# Patient Record
Sex: FEMALE | Race: WHITE | Employment: FULL TIME | URBAN - METROPOLITAN AREA
[De-identification: names, ages, dates, MRNs, and addresses within clinical notes are randomized per-mention and may not be internally consistent; named-entity substitution may affect disease eponyms.]

---

## 2017-12-13 ENCOUNTER — OFFICE VISIT (OUTPATIENT)
Dept: FAMILY MEDICINE CLINIC | Facility: CLINIC | Age: 42
End: 2017-12-13

## 2017-12-13 VITALS
HEART RATE: 92 BPM | SYSTOLIC BLOOD PRESSURE: 100 MMHG | BODY MASS INDEX: 27.88 KG/M2 | DIASTOLIC BLOOD PRESSURE: 70 MMHG | WEIGHT: 131 LBS | OXYGEN SATURATION: 97 % | HEIGHT: 57.5 IN

## 2017-12-13 DIAGNOSIS — M79.10 MYALGIA: Primary | ICD-10-CM

## 2017-12-13 DIAGNOSIS — M72.2 PLANTAR FASCIITIS: ICD-10-CM

## 2017-12-13 DIAGNOSIS — Z00.00 HEALTHCARE MAINTENANCE: ICD-10-CM

## 2017-12-13 PROCEDURE — 99204 OFFICE O/P NEW MOD 45 MIN: CPT | Performed by: FAMILY MEDICINE

## 2017-12-13 RX ORDER — NAPROXEN 500 MG/1
500 TABLET ORAL 2 TIMES DAILY WITH MEALS
Qty: 28 TABLET | Refills: 0 | Status: SHIPPED | OUTPATIENT
Start: 2017-12-13 | End: 2017-12-27

## 2017-12-13 NOTE — PATIENT INSTRUCTIONS
Fascitis Plantar [Plantar Fasciitis]  La fascitis plantar es lilo inflamación dolorosa del tejido que cubre los huesos en la planta de apodaca pie. Ésta se puede desarrollar gradualmente o repentinamente. Normalmente afecta a un pie por vez.  El dolor de talón 6. Lo annia que debe hacer en la mañana y antes de hacer deportes, es estirar la planta de los pies. Flexione suavemente apodaca tobillo de manera que el pie se mueva hacia la rodilla. 7. Aplicarse hielo puede ayudar a controlar el dolor en el talón.  Aplíque

## 2017-12-17 NOTE — PROGRESS NOTES
HPI:    Patient ID: Stevenson Person is a 40year old female with body pain.       Body pain  -has b/l shoulder, knee, ankle, foot pain pain-for 2mo, gradually worsening.   -Pain is dull and soreness. Constant.  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  Pulmonary/Chest: Effort normal and breath sounds normal. No respiratory distress. She has no wheezes. She has no rales. Abdominal: Soft. Bowel sounds are normal. She exhibits no distension. There is no tenderness.    Musculoskeletal:      Right sh rash noted.               ASSESSMENT/PLAN:   Plantar fasciitis  -Rx for naproxen BID  -Advised to obtain good support shoes and orthotics  -Start PT exercises from handout provided  -rest and ice  -if no improvement may need custom orthotics, steroid inject

## 2017-12-29 ENCOUNTER — LAB ENCOUNTER (OUTPATIENT)
Dept: LAB | Facility: HOSPITAL | Age: 42
End: 2017-12-29
Attending: FAMILY MEDICINE
Payer: COMMERCIAL

## 2017-12-29 DIAGNOSIS — M79.10 MYALGIA: ICD-10-CM

## 2017-12-29 DIAGNOSIS — Z00.00 HEALTHCARE MAINTENANCE: ICD-10-CM

## 2017-12-29 LAB
ERYTHROCYTE [DISTWIDTH] IN BLOOD BY AUTOMATED COUNT: 13.6 % (ref 11–15)
HCT VFR BLD AUTO: 38.2 % (ref 35–48)
HGB BLD-MCNC: 12.5 G/DL (ref 12–16)
MCH RBC QN AUTO: 27.2 PG (ref 27–32)
MCHC RBC AUTO-ENTMCNC: 32.8 G/DL (ref 32–37)
MCV RBC AUTO: 82.8 FL (ref 80–100)
PLATELET # BLD AUTO: 233 K/UL (ref 140–400)
PMV BLD AUTO: 9.1 FL (ref 7.4–10.3)
RBC # BLD AUTO: 4.61 M/UL (ref 3.7–5.4)
WBC # BLD AUTO: 5.3 K/UL (ref 4–11)

## 2017-12-29 PROCEDURE — 80061 LIPID PANEL: CPT | Performed by: FAMILY MEDICINE

## 2017-12-29 PROCEDURE — 80053 COMPREHEN METABOLIC PANEL: CPT | Performed by: FAMILY MEDICINE

## 2017-12-29 PROCEDURE — 86235 NUCLEAR ANTIGEN ANTIBODY: CPT

## 2017-12-29 PROCEDURE — 86039 ANTINUCLEAR ANTIBODIES (ANA): CPT

## 2017-12-29 PROCEDURE — 86200 CCP ANTIBODY: CPT | Performed by: FAMILY MEDICINE

## 2017-12-29 PROCEDURE — 84443 ASSAY THYROID STIM HORMONE: CPT | Performed by: FAMILY MEDICINE

## 2017-12-29 PROCEDURE — 84439 ASSAY OF FREE THYROXINE: CPT | Performed by: FAMILY MEDICINE

## 2017-12-29 PROCEDURE — 82306 VITAMIN D 25 HYDROXY: CPT | Performed by: FAMILY MEDICINE

## 2017-12-29 PROCEDURE — 86431 RHEUMATOID FACTOR QUANT: CPT | Performed by: FAMILY MEDICINE

## 2017-12-29 PROCEDURE — 85652 RBC SED RATE AUTOMATED: CPT | Performed by: FAMILY MEDICINE

## 2017-12-29 PROCEDURE — 86140 C-REACTIVE PROTEIN: CPT | Performed by: FAMILY MEDICINE

## 2017-12-29 PROCEDURE — 86225 DNA ANTIBODY NATIVE: CPT

## 2017-12-29 PROCEDURE — 36415 COLL VENOUS BLD VENIPUNCTURE: CPT

## 2017-12-29 PROCEDURE — 82550 ASSAY OF CK (CPK): CPT | Performed by: FAMILY MEDICINE

## 2017-12-29 PROCEDURE — 85027 COMPLETE CBC AUTOMATED: CPT

## 2017-12-29 PROCEDURE — 83036 HEMOGLOBIN GLYCOSYLATED A1C: CPT | Performed by: FAMILY MEDICINE

## 2017-12-29 PROCEDURE — 86038 ANTINUCLEAR ANTIBODIES: CPT

## 2017-12-29 RX ORDER — NAPROXEN 500 MG/1
TABLET ORAL
Qty: 30 TABLET | Refills: 0 | Status: SHIPPED | OUTPATIENT
Start: 2017-12-29 | End: 2018-01-09

## 2017-12-29 RX ORDER — LEVOTHYROXINE SODIUM 88 UG/1
TABLET ORAL
Qty: 60 TABLET | Refills: 0 | Status: SHIPPED | OUTPATIENT
Start: 2017-12-29 | End: 2018-02-28

## 2018-01-02 LAB
ANA NUCLEOLAR TITR SER IF: 80 {TITER}
DSDNA AB TITR SER: <10 {TITER}
NUCLEAR IGG TITR SER IF: POSITIVE {TITER}

## 2018-01-03 LAB
ENA SM IGG SER QL: NEGATIVE
ENA SM+RNP AB SER QL: NEGATIVE
ENA SS-A AB SER QL IA: NEGATIVE
ENA SS-B AB SER QL IA: NEGATIVE

## 2018-01-04 LAB — 25(OH)D3 SERPL-MCNC: 36.2 NG/ML

## 2018-01-09 ENCOUNTER — TELEPHONE (OUTPATIENT)
Dept: FAMILY MEDICINE CLINIC | Facility: CLINIC | Age: 43
End: 2018-01-09

## 2018-01-09 DIAGNOSIS — M05.79 RHEUMATOID ARTHRITIS INVOLVING MULTIPLE SITES WITH POSITIVE RHEUMATOID FACTOR (HCC): Primary | ICD-10-CM

## 2018-01-09 RX ORDER — NAPROXEN 500 MG/1
TABLET ORAL
Qty: 30 TABLET | Refills: 0 | Status: SHIPPED | OUTPATIENT
Start: 2018-01-09 | End: 2018-01-18

## 2018-01-09 NOTE — TELEPHONE ENCOUNTER
I called patient to provide her the # to Dr Yue Cantu, but she is at work and couldn't talk to me, I suggested to send me to her voicemail for me to leave the specialist information, she agreed.   I called patient back and left her a v/m with specialist's i

## 2018-01-18 ENCOUNTER — TELEPHONE (OUTPATIENT)
Dept: FAMILY MEDICINE CLINIC | Facility: CLINIC | Age: 43
End: 2018-01-18

## 2018-01-18 RX ORDER — NAPROXEN 500 MG/1
TABLET ORAL
Qty: 30 TABLET | Refills: 1 | Status: SHIPPED | OUTPATIENT
Start: 2018-01-18 | End: 2018-01-31

## 2018-01-31 ENCOUNTER — OFFICE VISIT (OUTPATIENT)
Dept: FAMILY MEDICINE CLINIC | Facility: CLINIC | Age: 43
End: 2018-01-31

## 2018-01-31 VITALS — DIASTOLIC BLOOD PRESSURE: 70 MMHG | SYSTOLIC BLOOD PRESSURE: 108 MMHG | OXYGEN SATURATION: 98 % | HEART RATE: 92 BPM

## 2018-01-31 DIAGNOSIS — E03.9 HYPOTHYROIDISM, UNSPECIFIED TYPE: ICD-10-CM

## 2018-01-31 DIAGNOSIS — M05.79 RHEUMATOID ARTHRITIS INVOLVING MULTIPLE SITES WITH POSITIVE RHEUMATOID FACTOR (HCC): Primary | ICD-10-CM

## 2018-01-31 DIAGNOSIS — L21.9 SEBORRHEIC DERMATITIS OF SCALP: ICD-10-CM

## 2018-01-31 DIAGNOSIS — R74.01 TRANSAMINITIS: ICD-10-CM

## 2018-01-31 PROCEDURE — 36415 COLL VENOUS BLD VENIPUNCTURE: CPT | Performed by: FAMILY MEDICINE

## 2018-01-31 PROCEDURE — 90471 IMMUNIZATION ADMIN: CPT | Performed by: FAMILY MEDICINE

## 2018-01-31 PROCEDURE — 99213 OFFICE O/P EST LOW 20 MIN: CPT | Performed by: FAMILY MEDICINE

## 2018-01-31 PROCEDURE — 90686 IIV4 VACC NO PRSV 0.5 ML IM: CPT | Performed by: FAMILY MEDICINE

## 2018-01-31 RX ORDER — NAPROXEN 500 MG/1
TABLET ORAL
Qty: 30 TABLET | Refills: 1 | Status: SHIPPED | OUTPATIENT
Start: 2018-01-31 | End: 2018-07-10

## 2018-01-31 RX ORDER — PREDNISONE 1 MG/1
5 TABLET ORAL DAILY
Qty: 7 TABLET | Refills: 0 | Status: SHIPPED | OUTPATIENT
Start: 2018-01-31 | End: 2018-02-02

## 2018-02-01 LAB
AMB EXT CREATININE: 0.86 MG/DL
AMB EXT GFR: 83
AMB EXT GLUCOSE: 119 MG/DL
AMB EXT TSH: 1.66 MIU/ML

## 2018-02-01 NOTE — PATIENT INSTRUCTIONS
Hipotiroidismo    A usted le montes diagnosticado hipotiroidismo. Halma significa que apodaca glándula tiroides no está produciendo suficiente hormona tiroidea. Esta hormona es importante para el crecimiento del cuerpo y el metabolismo.  Si no tiene suficiente, mu · [place] marilyn pastillas de hormona tiroidea exactamente james le indicó apodaca proveedor de [place] quiroz. En la [employer] de los casos es lilo pastilla por día con el estómago vacío.  Use un pastillero con los días de la semana para ayudarse a recordar noble apodaca pas © 7239-6584 [address]. [facility], [facility]. Todos los derechos reservados. Esta información no pretende sustituir la atención médica profesional. Sólo apodaca médico puede diagnosticar y tratar un problema de aldo. · Para ayudar a controlar la hinchazón y el dolor, puede noble paracetamol (acetaminofeno) o ibuprofeno, a menos que se le haya recetado otro medicamento antiinflamatorio no esteroide (\"NSAID\", por marilyn siglas en inglés).  Nota: Si usted tiene lilo enfermed · Hable con apodaca proveedor de atención médica o terapeuta ocupacional acerca de las maneras más fáciles de llevar a cabo las labores diarias. [place] puede incluir el uso de dispositivos de asistencia.  Son herramientas especiales que pueden ayudarle con tareas

## 2018-02-02 ENCOUNTER — OFFICE VISIT (OUTPATIENT)
Dept: RHEUMATOLOGY | Facility: CLINIC | Age: 43
End: 2018-02-02

## 2018-02-02 VITALS
HEART RATE: 80 BPM | SYSTOLIC BLOOD PRESSURE: 113 MMHG | BODY MASS INDEX: 27.8 KG/M2 | WEIGHT: 130.63 LBS | HEIGHT: 57.5 IN | DIASTOLIC BLOOD PRESSURE: 77 MMHG

## 2018-02-02 DIAGNOSIS — M05.79 RHEUMATOID ARTHRITIS INVOLVING MULTIPLE SITES WITH POSITIVE RHEUMATOID FACTOR (HCC): Primary | ICD-10-CM

## 2018-02-02 DIAGNOSIS — Z51.81 ENCOUNTER FOR THERAPEUTIC DRUG MONITORING: ICD-10-CM

## 2018-02-02 PROCEDURE — 99244 OFF/OP CNSLTJ NEW/EST MOD 40: CPT | Performed by: INTERNAL MEDICINE

## 2018-02-02 PROCEDURE — 99212 OFFICE O/P EST SF 10 MIN: CPT | Performed by: INTERNAL MEDICINE

## 2018-02-02 RX ORDER — KETOCONAZOLE 20 MG/ML
SHAMPOO TOPICAL
Qty: 120 ML | Refills: 1 | Status: SHIPPED | OUTPATIENT
Start: 2018-02-02 | End: 2018-06-13

## 2018-02-02 RX ORDER — FOLIC ACID 1 MG/1
TABLET ORAL
Qty: 90 TABLET | Refills: 3 | Status: SHIPPED | OUTPATIENT
Start: 2018-02-02 | End: 2019-01-06

## 2018-02-02 RX ORDER — METHOTREXATE 2.5 MG/1
TABLET ORAL
Qty: 20 TABLET | Refills: 1 | Status: SHIPPED | OUTPATIENT
Start: 2018-02-02 | End: 2018-04-02

## 2018-02-02 RX ORDER — PREDNISONE 1 MG/1
TABLET ORAL
Qty: 100 TABLET | Refills: 1 | Status: SHIPPED | OUTPATIENT
Start: 2018-02-02 | End: 2018-03-28

## 2018-02-02 NOTE — PROGRESS NOTES
Dear Dr. Lisandra Snell: I saw your patient Samreen Peck in consultation this afternoon at your request for evaluation of rheumatoid arthritis.   As you know, she is a delightful 31-year-old Cook Islander speaker, who 6 weeks ago developed pain, swelling, nausea or vomiting, constipation or diarrhea, blood in her stools. No trouble urinating. No dry eyes or mouth. No Raynaud's or headache. Physical exam:  Pleasant woman in no acute distress. Blood pressure 113/77, pulse 80, height 4' 9.5\" (1.461 m), w

## 2018-02-02 NOTE — PROGRESS NOTES
HPI:    Patient ID: Jaydon Landrum is a 44year old female.     RA  -naproxen helping but still with pain in hands, wrist, feet, hips, shoulders  -hard to  objects or make fists  -has upcoming meeting w/ rheum in 3d    Hypothyroid/DM  -compliant w/ m Normocephalic and atraumatic. Mouth/Throat: Mucous membranes are normal.   Eyes: Conjunctivae are normal. Pupils are equal, round, and reactive to light. Neck: Neck supple. Cardiovascular: Normal rate and regular rhythm. No murmur heard.   Pulmonar 0.5 ML         WK#[id number]

## 2018-02-21 ENCOUNTER — TELEPHONE (OUTPATIENT)
Dept: FAMILY MEDICINE CLINIC | Facility: CLINIC | Age: 43
End: 2018-02-21

## 2018-02-21 NOTE — TELEPHONE ENCOUNTER
Called and tried leaving a message for patient to inform her regarding a bill she received for 30$. Called the billing department to get more information.  Billing department will sent bill for review stating insurance might have Dr. Lonzo Sandifer as a Jay Vivaringmariluz

## 2018-02-28 ENCOUNTER — TELEPHONE (OUTPATIENT)
Dept: FAMILY MEDICINE CLINIC | Facility: CLINIC | Age: 43
End: 2018-02-28

## 2018-02-28 RX ORDER — LEVOTHYROXINE SODIUM 88 UG/1
TABLET ORAL
Qty: 30 TABLET | Refills: 0 | Status: SHIPPED | OUTPATIENT
Start: 2018-02-28 | End: 2018-03-28

## 2018-03-02 ENCOUNTER — OFFICE VISIT (OUTPATIENT)
Dept: RHEUMATOLOGY | Facility: CLINIC | Age: 43
End: 2018-03-02

## 2018-03-02 VITALS
WEIGHT: 133 LBS | BODY MASS INDEX: 28.3 KG/M2 | HEIGHT: 57.5 IN | HEART RATE: 97 BPM | DIASTOLIC BLOOD PRESSURE: 71 MMHG | SYSTOLIC BLOOD PRESSURE: 119 MMHG

## 2018-03-02 DIAGNOSIS — Z51.81 ENCOUNTER FOR THERAPEUTIC DRUG MONITORING: ICD-10-CM

## 2018-03-02 DIAGNOSIS — M05.79 RHEUMATOID ARTHRITIS INVOLVING MULTIPLE SITES WITH POSITIVE RHEUMATOID FACTOR (HCC): Primary | ICD-10-CM

## 2018-03-02 PROCEDURE — 99214 OFFICE O/P EST MOD 30 MIN: CPT | Performed by: INTERNAL MEDICINE

## 2018-03-02 PROCEDURE — 99212 OFFICE O/P EST SF 10 MIN: CPT | Performed by: INTERNAL MEDICINE

## 2018-03-02 NOTE — PROGRESS NOTES
HPI:    Patient ID: Yoon Victor is a 44year old female. Kari Alizejigna is a 50-year-old patient of Dr. Bashir Salazar, with recent onset CCP and RF positive rheumatoid arthritis. Her family is here to interpret her Indonesian today.     She is on 10 mg of Rfl: 1     Allergies:No Known Allergies   PHYSICAL EXAM:   Physical Exam   Constitutional: She is oriented to person, place, and time. She appears well-developed and well-nourished. HENT:   Head: Normocephalic.    Eyes: Conjunctivae are normal.   Cardiova

## 2018-03-09 LAB
ABSOLUTE BASOPHILS: 22 CELLS/UL (ref 0–200)
ABSOLUTE EOSINOPHILS: 80 CELLS/UL (ref 15–500)
ABSOLUTE LYMPHOCYTES: 1007 CELLS/UL (ref 850–3900)
ABSOLUTE MONOCYTES: 256 CELLS/UL (ref 200–950)
ABSOLUTE NEUTROPHILS: 5935 CELLS/UL (ref 1500–7800)
ALBUMIN: 4 G/DL (ref 3.6–5.1)
AST: 16 U/L (ref 10–30)
BASOPHILS: 0.3 %
C-REACTIVE PROTEIN: 1.5 MG/L
CREATININE: 0.63 MG/DL (ref 0.5–1.1)
EGFR IF AFRICN AM: 128 ML/MIN/1.73M2
EGFR IF NONAFRICN AM: 111 ML/MIN/1.73M2
EOSINOPHILS: 1.1 %
HEMATOCRIT: 38 % (ref 35–45)
HEMOGLOBIN: 12.2 G/DL (ref 11.7–15.5)
LYMPHOCYTES: 13.8 %
MCH: 26.1 PG (ref 27–33)
MCHC: 32.1 G/DL (ref 32–36)
MCV: 81.4 FL (ref 80–100)
MONOCYTES: 3.5 %
MPV: 11.9 FL (ref 7.5–12.5)
NEUTROPHILS: 81.3 %
PLATELET COUNT: 252 THOUSAND/UL (ref 140–400)
RDW: 13.5 % (ref 11–15)
RED BLOOD CELL COUNT: 4.67 MILLION/UL (ref 3.8–5.1)
SED RATE BY MODIFIED$WESTERGREN: 19 MM/H
WHITE BLOOD CELL COUNT: 7.3 THOUSAND/UL (ref 3.8–10.8)

## 2018-03-14 ENCOUNTER — OFFICE VISIT (OUTPATIENT)
Dept: FAMILY MEDICINE CLINIC | Facility: CLINIC | Age: 43
End: 2018-03-14

## 2018-03-14 VITALS
HEIGHT: 57.5 IN | HEART RATE: 86 BPM | DIASTOLIC BLOOD PRESSURE: 60 MMHG | SYSTOLIC BLOOD PRESSURE: 110 MMHG | RESPIRATION RATE: 16 BRPM | WEIGHT: 133 LBS | BODY MASS INDEX: 28.3 KG/M2 | OXYGEN SATURATION: 98 %

## 2018-03-14 DIAGNOSIS — E11.9 TYPE 2 DIABETES MELLITUS WITHOUT COMPLICATION, WITHOUT LONG-TERM CURRENT USE OF INSULIN (HCC): Primary | ICD-10-CM

## 2018-03-14 DIAGNOSIS — E03.9 HYPOTHYROIDISM, UNSPECIFIED TYPE: ICD-10-CM

## 2018-03-14 DIAGNOSIS — M05.79 RHEUMATOID ARTHRITIS INVOLVING MULTIPLE SITES WITH POSITIVE RHEUMATOID FACTOR (HCC): ICD-10-CM

## 2018-03-14 PROCEDURE — 99213 OFFICE O/P EST LOW 20 MIN: CPT | Performed by: FAMILY MEDICINE

## 2018-03-14 NOTE — PATIENT INSTRUCTIONS
Dieta: Diabetes [Diabetic Diet]  Los alimentos son Paulette Melara herramienta importante que puede utilizar para controlar la diabetes y [place] law.  [place] alimentos junie equilibrados en las cantidades [employer] ayudará a controlar la concentración de azúcar · Coma menos grasa para ayudar a reducir apodaca riesgo de enfermedad del corazón. Consuma productos lácteos sin grasa o con bajo contenido graso y Terryl Mins. Evite los alimentos fritos. Use aceites de cocina no saturados.   · Consulte a apodaca nutricionista sob · ¿Se siente cansado constantemente? · ¿Tiene necesidad de orinar a menudo? · ¿Tiene sed o hambre continuamente? · ¿Está perdiendo peso sin motivo aparente? · ¿Tardan mucho en curársele las cortadas y los moretones?   · ¿Siente hormigueo o adormecimient Manejar la diabetes tipo 1 significa equilibrar el uso de insulina con la dieta y la actividad. Usted tendrá que revisarse apodaca nivel de azúcar en la angela y, a veces, las cetonas.  También tendrá que trabajar con apodaca proveedor de atención médica Dennice Paddock [place] a apodaca proveedor de [employer] trabaje con linda para crear un programa de actividad que sea [place] para marilyn necesidades.  Apodaca programa de actividad estará basado en apodaca edad, estado general de aldo y el tipo de actividades que usted disfrut Cuando se tiene diabetes, es más probable que se desarrollen otros problemas de aldo, tales james problemas en los pies, los ojos, el corazón y los riñones.  Al controlar marilyn niveles de azúcar en la angela y cuidarse usted mismo [place], puede preveni La mayoría de las personas afronta desafíos a lo dionisio de marilyn vidas. Vivir con diabetes, o con cualquier enfermedad seria, puede aumentar apodaca nivel de estrés y hacer que sienta lilo variedad de emociones.  Si tiene diabetes, sentirse estresado o deprimido pue El sistema digestivo descompone los alimentos y los convierte en un tipo de azúcar llamado glucosa. [place] parte de esta glucosa se almacena en el hígado.  Raheel la mayor parte de estelita entra en el torrente sanguíneo y se desplaza hasta las células para ser cons Si el alto nivel de azúcar no se controla, los vasos sanguíneos pueden dañarse. Un alto nivel de azúcar vijay un período prolongado afecta a los órganos y a los nervios.  En consecuencia, [employer] de daños al corazón, a los riñones, a los ojos

## 2018-03-16 NOTE — PROGRESS NOTES
HPI:    Patient ID: Elsie Shipley is a 44year old female.     RA  -pain resolved with rheum meds    Hypothyroid  -compliant w/ meds  -asymptomatic    DM  -monitoring diet  -does not check BS  -has not seen eye doc  -no vision changes, numbness/tingling Constitutional: She appears well-developed and well-nourished. No distress. HENT:   Head: Normocephalic and atraumatic. Mouth/Throat: Mucous membranes are normal.   Eyes: Conjunctivae are normal. Pupils are equal, round, and reactive to light.    Neck

## 2018-03-22 LAB
HEMOGLOBIN A1C: 8 % OF TOTAL HGB
TSH W/REFLEX TO FT4: 1.73 MIU/L

## 2018-03-22 RX ORDER — LEVOTHYROXINE SODIUM 88 UG/1
TABLET ORAL
Qty: 30 TABLET | Refills: 0 | Status: CANCELLED | OUTPATIENT
Start: 2018-03-22

## 2018-03-28 ENCOUNTER — TELEPHONE (OUTPATIENT)
Dept: RHEUMATOLOGY | Facility: CLINIC | Age: 43
End: 2018-03-28

## 2018-03-28 ENCOUNTER — TELEPHONE (OUTPATIENT)
Dept: FAMILY MEDICINE CLINIC | Facility: CLINIC | Age: 43
End: 2018-03-28

## 2018-03-28 DIAGNOSIS — E03.9 HYPOTHYROIDISM, UNSPECIFIED TYPE: Primary | ICD-10-CM

## 2018-03-28 RX ORDER — PREDNISONE 1 MG/1
10 TABLET ORAL DAILY
Qty: 60 TABLET | Refills: 1 | Status: SHIPPED | OUTPATIENT
Start: 2018-03-28 | End: 2018-10-12

## 2018-03-28 RX ORDER — LEVOTHYROXINE SODIUM 88 UG/1
TABLET ORAL
Qty: 90 TABLET | Refills: 0 | Status: SHIPPED | OUTPATIENT
Start: 2018-03-28 | End: 2018-07-10

## 2018-03-28 NOTE — TELEPHONE ENCOUNTER
Script approved by provider for #100 with 1 refill on 2/2/18. Pt changed pharmacy, new script sent to requested pharmacy.

## 2018-03-28 NOTE — TELEPHONE ENCOUNTER
Per Dr. Na Wisdom, ok to refill thyroid medication for #90 and have pt come back in 3 months for f/up blood work. Medication e-rx to preferred pharmacy. Unable to leave message - music playing as answering machine, no option to leave voicemail.     Contacte

## 2018-03-28 NOTE — TELEPHONE ENCOUNTER
Pt called needs a refill on thyroid medication.  If we could please send it to [facility] in [place]

## 2018-03-28 NOTE — TELEPHONE ENCOUNTER
Patient is calling to request a refill for the medication listed below. Please advise. Patient is out of the medications.        predniSONE 5 MG Oral Tab Take 6 now, 5 Q AM x 1, 4 Q AM x 1, 3 Q AM x 1, and then 2 Q AM. Disp: 100 tablet Rfl: 1

## 2018-03-31 NOTE — PROGRESS NOTES
Dee Dee Prabhakar is a 26-year-old patient of Dr. Christine Morejon, with recent onset CCP and RF positive rheumatoid arthritis. Her family is here to interpret her Kenyan again today.     Since I saw her March 2nd of 2018, she has remained on 10 mg of prednisone ever shawna con comida para la diabetes Disp: 180 tablet Rfl: 1     Allergies:No Known Allergies   PHYSICAL EXAM:   Physical Exam   Constitutional: She is oriented to person, place, and time. She appears well-developed and well-nourished.    HENT:   Head: Isabella

## 2018-04-02 ENCOUNTER — OFFICE VISIT (OUTPATIENT)
Dept: RHEUMATOLOGY | Facility: CLINIC | Age: 43
End: 2018-04-02

## 2018-04-02 VITALS
DIASTOLIC BLOOD PRESSURE: 76 MMHG | WEIGHT: 134 LBS | BODY MASS INDEX: 28.51 KG/M2 | HEIGHT: 57.5 IN | SYSTOLIC BLOOD PRESSURE: 114 MMHG | HEART RATE: 90 BPM

## 2018-04-02 DIAGNOSIS — Z51.81 ENCOUNTER FOR THERAPEUTIC DRUG MONITORING: ICD-10-CM

## 2018-04-02 DIAGNOSIS — M05.79 RHEUMATOID ARTHRITIS INVOLVING MULTIPLE SITES WITH POSITIVE RHEUMATOID FACTOR (HCC): Primary | ICD-10-CM

## 2018-04-02 PROCEDURE — 99212 OFFICE O/P EST SF 10 MIN: CPT | Performed by: INTERNAL MEDICINE

## 2018-04-02 PROCEDURE — 99214 OFFICE O/P EST MOD 30 MIN: CPT | Performed by: INTERNAL MEDICINE

## 2018-04-02 RX ORDER — METHOTREXATE 2.5 MG/1
TABLET ORAL
Qty: 30 TABLET | Refills: 1 | Status: SHIPPED | OUTPATIENT
Start: 2018-04-02 | End: 2018-05-28

## 2018-05-06 NOTE — PROGRESS NOTES
Marcy Gillette is a 43-year-old patient of Dr. Tristan Mir, with recent onset CCP and RF positive rheumatoid arthritis. Her family is here to interpret her Romansh again today.     Since I saw her April 2nd of 2018, she has dropped from 10 mg to 5 mg of predni Tab [place] 1 tableta (500mg) dos veces al shawna con comida para la diabetes Disp: 180 tablet Rfl: 1     Allergies:No Known Allergies   PHYSICAL EXAM:   Physical Exam   Constitutional: She is oriented to person, place, and time.  She appears well-developed and w

## 2018-05-07 ENCOUNTER — OFFICE VISIT (OUTPATIENT)
Dept: RHEUMATOLOGY | Facility: CLINIC | Age: 43
End: 2018-05-07

## 2018-05-07 VITALS
DIASTOLIC BLOOD PRESSURE: 80 MMHG | SYSTOLIC BLOOD PRESSURE: 116 MMHG | WEIGHT: 136.88 LBS | HEIGHT: 57.5 IN | BODY MASS INDEX: 29.13 KG/M2 | HEART RATE: 90 BPM

## 2018-05-07 DIAGNOSIS — Z51.81 ENCOUNTER FOR THERAPEUTIC DRUG MONITORING: ICD-10-CM

## 2018-05-07 DIAGNOSIS — M05.79 RHEUMATOID ARTHRITIS INVOLVING MULTIPLE SITES WITH POSITIVE RHEUMATOID FACTOR (HCC): Primary | ICD-10-CM

## 2018-05-07 PROCEDURE — 99214 OFFICE O/P EST MOD 30 MIN: CPT | Performed by: INTERNAL MEDICINE

## 2018-05-07 PROCEDURE — 99212 OFFICE O/P EST SF 10 MIN: CPT | Performed by: INTERNAL MEDICINE

## 2018-05-07 RX ORDER — PREDNISONE 1 MG/1
TABLET ORAL
Qty: 270 TABLET | Refills: 3 | Status: SHIPPED | OUTPATIENT
Start: 2018-05-07 | End: 2019-01-21

## 2018-05-29 NOTE — TELEPHONE ENCOUNTER
LOV:5-7  Last Filled:4-2, #30 with 1 refill  Labs:5-5, AST 31  Future Appointments  Date Time Provider Marilu Vallecillo   6/13/2018 6:00 PM Gema Mckinley MD [address]   6/22/2018 10:20 AM Anette Wyman MD [address] [place] [address]

## 2018-06-13 ENCOUNTER — OFFICE VISIT (OUTPATIENT)
Dept: FAMILY MEDICINE CLINIC | Facility: CLINIC | Age: 43
End: 2018-06-13

## 2018-06-13 VITALS
WEIGHT: 135 LBS | DIASTOLIC BLOOD PRESSURE: 70 MMHG | HEART RATE: 88 BPM | OXYGEN SATURATION: 98 % | SYSTOLIC BLOOD PRESSURE: 112 MMHG | BODY MASS INDEX: 29 KG/M2

## 2018-06-13 DIAGNOSIS — E11.9 TYPE 2 DIABETES MELLITUS WITHOUT COMPLICATION, WITHOUT LONG-TERM CURRENT USE OF INSULIN (HCC): Primary | ICD-10-CM

## 2018-06-13 DIAGNOSIS — L21.9 SEBORRHEIC DERMATITIS OF SCALP: ICD-10-CM

## 2018-06-13 DIAGNOSIS — M05.79 RHEUMATOID ARTHRITIS INVOLVING MULTIPLE SITES WITH POSITIVE RHEUMATOID FACTOR (HCC): ICD-10-CM

## 2018-06-13 PROCEDURE — 99213 OFFICE O/P EST LOW 20 MIN: CPT | Performed by: FAMILY MEDICINE

## 2018-06-13 RX ORDER — KETOCONAZOLE 20 MG/ML
SHAMPOO TOPICAL
Qty: 120 ML | Refills: 1 | Status: SHIPPED | OUTPATIENT
Start: 2018-06-13 | End: 2019-08-30

## 2018-06-19 NOTE — PROGRESS NOTES
Carroll Aguiar is a 52-year-old patient of Dr. Mykel Castano, with recent onset CCP and RF positive rheumatoid arthritis. Her family is here to interpret her Greenlandic again today.     Since I saw her May 7th of 2018, she dropped from 5 to 3 mg of prednisone every scalp, lather, leave on 3 to 5 minutes, and rinse; apply twice weekly for 2 to 4 weeks, then once weekly thereafter Disp: 120 mL Rfl: 1   naproxen 500 MG Oral Tab [place] 1 tableta (500mg) dos veces al shawna si lo necesita para el dolor Disp: 30 tablet Rfl: 1

## 2018-06-19 NOTE — PROGRESS NOTES
HPI:    Patient ID: Miriam Macedo is a 44year old female. Doing well. RA stable  Sebarrrhoic dermatitis improved significantly with shampoo  DM2-> compliant w/ meds. Working hard on adhering to diet and exercise. Has not seen optho yet.  No foot les Normocephalic and atraumatic. Mouth/Throat: Oropharynx is clear and moist and mucous membranes are normal.   Eyes: Conjunctivae are normal. Pupils are equal, round, and reactive to light. Neck: Neck supple.    Cardiovascular: Normal rate, regular rhythm

## 2018-06-22 ENCOUNTER — OFFICE VISIT (OUTPATIENT)
Dept: RHEUMATOLOGY | Facility: CLINIC | Age: 43
End: 2018-06-22

## 2018-06-22 VITALS
HEIGHT: 57.5 IN | HEART RATE: 86 BPM | BODY MASS INDEX: 28.81 KG/M2 | SYSTOLIC BLOOD PRESSURE: 116 MMHG | WEIGHT: 135.38 LBS | DIASTOLIC BLOOD PRESSURE: 73 MMHG

## 2018-06-22 DIAGNOSIS — Z51.81 ENCOUNTER FOR THERAPEUTIC DRUG MONITORING: ICD-10-CM

## 2018-06-22 DIAGNOSIS — M05.79 RHEUMATOID ARTHRITIS INVOLVING MULTIPLE SITES WITH POSITIVE RHEUMATOID FACTOR (HCC): Primary | ICD-10-CM

## 2018-06-22 PROCEDURE — 99212 OFFICE O/P EST SF 10 MIN: CPT | Performed by: INTERNAL MEDICINE

## 2018-06-22 PROCEDURE — 99214 OFFICE O/P EST MOD 30 MIN: CPT | Performed by: INTERNAL MEDICINE

## 2018-07-06 ENCOUNTER — TELEPHONE (OUTPATIENT)
Dept: FAMILY MEDICINE CLINIC | Facility: CLINIC | Age: 43
End: 2018-07-06

## 2018-07-06 NOTE — TELEPHONE ENCOUNTER
----- Message from Osiris Mobley RN sent at 7/6/2018  9:46 AM CDT -----      ----- Message -----  From: Analisa Valencia MD  Sent: 6/25/2018   1:45 PM  To: Osiris Mobley RN    Can you let her know her kidneys are normal as well as her el

## 2018-07-06 NOTE — TELEPHONE ENCOUNTER
Called and spoke to patient regarding lab results. Pt verbalized understanding and had no further questions.

## 2018-07-10 ENCOUNTER — TELEPHONE (OUTPATIENT)
Dept: FAMILY MEDICINE CLINIC | Facility: CLINIC | Age: 43
End: 2018-07-10

## 2018-07-10 RX ORDER — LEVOTHYROXINE SODIUM 88 UG/1
TABLET ORAL
Qty: 30 TABLET | Refills: 0 | Status: SHIPPED | OUTPATIENT
Start: 2018-07-10 | End: 2018-08-14

## 2018-07-10 RX ORDER — NAPROXEN 500 MG/1
TABLET ORAL
Qty: 30 TABLET | Refills: 1 | Status: SHIPPED | OUTPATIENT
Start: 2018-07-10 | End: 2019-02-13

## 2018-07-10 NOTE — TELEPHONE ENCOUNTER
Patient needs to come in and  an order to get labs done, a refill will be authorize and send to her pharmacy.

## 2018-07-31 NOTE — PROGRESS NOTES
Wilver Cho is a 43-year-old patient of Dr. Aniyah Mejia, with recent onset CCP and RF positive rheumatoid arthritis. Her family is here to interpret her Mauritanian again today.     Since I saw her June 22nd of 2018, she has remained on 4mg of prednisone every si lo necesita para el dolor Disp: 30 tablet Rfl: 1   MetFORMIN HCl 500 MG Oral Tab [place] 1 tableta (500mg) dos veces al shawna con comida para la diabetes Disp: 180 tablet Rfl: 1     Allergies:No Known Allergies   PHYSICAL EXAM:   Physical Exam   Constitution

## 2018-08-03 ENCOUNTER — OFFICE VISIT (OUTPATIENT)
Dept: RHEUMATOLOGY | Facility: CLINIC | Age: 43
End: 2018-08-03
Payer: COMMERCIAL

## 2018-08-03 VITALS
BODY MASS INDEX: 28.72 KG/M2 | WEIGHT: 135 LBS | DIASTOLIC BLOOD PRESSURE: 74 MMHG | SYSTOLIC BLOOD PRESSURE: 109 MMHG | HEART RATE: 66 BPM | HEIGHT: 57.5 IN

## 2018-08-03 DIAGNOSIS — Z51.81 ENCOUNTER FOR THERAPEUTIC DRUG MONITORING: ICD-10-CM

## 2018-08-03 DIAGNOSIS — M05.79 RHEUMATOID ARTHRITIS INVOLVING MULTIPLE SITES WITH POSITIVE RHEUMATOID FACTOR (HCC): Primary | ICD-10-CM

## 2018-08-03 PROCEDURE — 99212 OFFICE O/P EST SF 10 MIN: CPT | Performed by: INTERNAL MEDICINE

## 2018-08-03 PROCEDURE — 99214 OFFICE O/P EST MOD 30 MIN: CPT | Performed by: INTERNAL MEDICINE

## 2018-08-04 LAB
ABSOLUTE BASOPHILS: 19 CELLS/UL (ref 0–200)
ABSOLUTE EOSINOPHILS: 118 CELLS/UL (ref 15–500)
ABSOLUTE LYMPHOCYTES: 1161 CELLS/UL (ref 850–3900)
ABSOLUTE MONOCYTES: 385 CELLS/UL (ref 200–950)
ABSOLUTE NEUTROPHILS: 3017 CELLS/UL (ref 1500–7800)
BASOPHILS: 0.4 %
C-REACTIVE PROTEIN: 2.5 MG/L
EOSINOPHILS: 2.5 %
HEMATOCRIT: 37 % (ref 35–45)
HEMOGLOBIN: 12 G/DL (ref 11.7–15.5)
LYMPHOCYTES: 24.7 %
MCH: 26.8 PG (ref 27–33)
MCHC: 32.4 G/DL (ref 32–36)
MCV: 82.8 FL (ref 80–100)
MONOCYTES: 8.2 %
MPV: 11.5 FL (ref 7.5–12.5)
NEUTROPHILS: 64.2 %
PLATELET COUNT: 244 THOUSAND/UL (ref 140–400)
RDW: 14.9 % (ref 11–15)
RED BLOOD CELL COUNT: 4.47 MILLION/UL (ref 3.8–5.1)
SED RATE BY MODIFIED$WESTERGREN: 17 MM/H
TSH W/REFLEX TO FT4: 0.91 MIU/L
WHITE BLOOD CELL COUNT: 4.7 THOUSAND/UL (ref 3.8–10.8)

## 2018-08-14 ENCOUNTER — OFFICE VISIT (OUTPATIENT)
Dept: FAMILY MEDICINE CLINIC | Facility: CLINIC | Age: 43
End: 2018-08-14
Payer: COMMERCIAL

## 2018-08-14 VITALS
HEART RATE: 88 BPM | HEIGHT: 57 IN | WEIGHT: 136 LBS | BODY MASS INDEX: 29.34 KG/M2 | DIASTOLIC BLOOD PRESSURE: 74 MMHG | OXYGEN SATURATION: 98 % | SYSTOLIC BLOOD PRESSURE: 124 MMHG | RESPIRATION RATE: 20 BRPM

## 2018-08-14 DIAGNOSIS — R74.01 TRANSAMINITIS: ICD-10-CM

## 2018-08-14 DIAGNOSIS — Z00.00 WELLNESS EXAMINATION: Primary | ICD-10-CM

## 2018-08-14 DIAGNOSIS — Z12.4 ENCOUNTER FOR PAPANICOLAOU SMEAR FOR CERVICAL CANCER SCREENING: ICD-10-CM

## 2018-08-14 DIAGNOSIS — M05.79 RHEUMATOID ARTHRITIS INVOLVING MULTIPLE SITES WITH POSITIVE RHEUMATOID FACTOR (HCC): ICD-10-CM

## 2018-08-14 DIAGNOSIS — E11.9 TYPE 2 DIABETES MELLITUS WITHOUT COMPLICATION, WITHOUT LONG-TERM CURRENT USE OF INSULIN (HCC): ICD-10-CM

## 2018-08-14 DIAGNOSIS — E03.9 HYPOTHYROIDISM, UNSPECIFIED TYPE: ICD-10-CM

## 2018-08-14 DIAGNOSIS — Z12.39 BREAST CANCER SCREENING: ICD-10-CM

## 2018-08-14 DIAGNOSIS — L21.9 SEBORRHEIC DERMATITIS OF SCALP: ICD-10-CM

## 2018-08-14 PROCEDURE — 99396 PREV VISIT EST AGE 40-64: CPT | Performed by: FAMILY MEDICINE

## 2018-08-14 PROCEDURE — 90732 PPSV23 VACC 2 YRS+ SUBQ/IM: CPT | Performed by: FAMILY MEDICINE

## 2018-08-14 PROCEDURE — 90471 IMMUNIZATION ADMIN: CPT | Performed by: FAMILY MEDICINE

## 2018-08-14 PROCEDURE — 99213 OFFICE O/P EST LOW 20 MIN: CPT | Performed by: FAMILY MEDICINE

## 2018-08-14 RX ORDER — LEVOTHYROXINE SODIUM 88 UG/1
TABLET ORAL
Qty: 90 TABLET | Refills: 1 | Status: SHIPPED | OUTPATIENT
Start: 2018-08-14 | End: 2018-11-29

## 2018-08-16 PROBLEM — L21.9 SEBORRHEIC DERMATITIS OF SCALP: Status: ACTIVE | Noted: 2018-08-16

## 2018-08-16 PROBLEM — R74.01 TRANSAMINITIS: Status: ACTIVE | Noted: 2018-08-16

## 2018-08-16 PROBLEM — E11.9 TYPE 2 DIABETES MELLITUS WITHOUT COMPLICATION, WITHOUT LONG-TERM CURRENT USE OF INSULIN (HCC): Status: ACTIVE | Noted: 2018-08-16

## 2018-08-16 PROBLEM — E03.9 HYPOTHYROIDISM: Status: ACTIVE | Noted: 2018-08-16

## 2018-08-16 NOTE — PROGRESS NOTES
CC: Annual Physical Exam    HPI:   Bairon Sexton is a 44year old female who presents for a complete physical exam.    HCM  -Diet:  Trying to maintain a Well-balanced diet. limited fast/fried/junk/soda food.    -Exercise irregularly  -Mental Health: Aayush Mercer NEUTROPHILS 64.2 %   LYMPHOCYTES 24.7 %   MONOCYTES 8.2 %   EOSINOPHILS 2.5 %   BASOPHILS 0.4 %   -SED RATE, WESTERGREN (AUTOMATED)   Result Value Ref Range   SED RATE BY MODIFIED$WESTERGREN 17 < OR = 20 mm/h         Current Outpatient Prescriptions:  Connie Kasper gain/loss, fever, chills, or fatigue. EENT:  Denies eye pain, vision changes. Denies hearing loss, congestion, runny nose or sore throat.   INTEGUMENTARY:  Denies rashes, skin lesion  CARDIOVASCULAR:  Denies chest pain, chest pressure, chest discomfort, pa hepatosplenomegaly.   : Introitus normal, no lesion, vagina moist, no significant discharge, cervix non-tender, strings visualized, no adnexal masses or tenderness, no masses on bimanual exam  EXTREMITIES:  No edema, FROM  NEURO:  No deficit, normal gait, appt. Thus Follow-up in 1mo for bloodwork. Then 4mo for DM. Reasurrance and education provided. All questions answered. Red flags/ ER precautions discussed.

## 2018-08-22 LAB — HPV MRNA E6/E7: NOT DETECTED

## 2018-09-18 ENCOUNTER — HOSPITAL ENCOUNTER (OUTPATIENT)
Dept: MAMMOGRAPHY | Facility: HOSPITAL | Age: 43
Discharge: HOME | End: 2018-09-18
Attending: FAMILY MEDICINE
Payer: COMMERCIAL

## 2018-09-18 DIAGNOSIS — Z12.39 BREAST CANCER SCREENING: ICD-10-CM

## 2018-09-18 PROCEDURE — 77066 DX MAMMO INCL CAD BI: CPT | Performed by: FAMILY MEDICINE

## 2018-09-18 PROCEDURE — 77062 BREAST TOMOSYNTHESIS BI: CPT | Performed by: FAMILY MEDICINE

## 2018-10-09 NOTE — PROGRESS NOTES
Jai Block is a 27-year-old patient of Dr. Aris Rueda, with recent onset CCP and RF positive rheumatoid arthritis. Her family is here to interpret her Hungarian again today.     Since I saw her August 3rd of 2018, she has been on 3mg of prednisone every mor tablet Rfl: 1     Allergies:No Known Allergies   PHYSICAL EXAM:   Physical Exam   Constitutional: She is oriented to person, place, and time. She appears well-developed and well-nourished. HENT:   Head: Normocephalic.    Eyes: Conjunctivae are normal.   C

## 2018-10-12 ENCOUNTER — OFFICE VISIT (OUTPATIENT)
Dept: RHEUMATOLOGY | Facility: CLINIC | Age: 43
End: 2018-10-12
Payer: COMMERCIAL

## 2018-10-12 VITALS
HEART RATE: 68 BPM | DIASTOLIC BLOOD PRESSURE: 77 MMHG | HEIGHT: 57 IN | SYSTOLIC BLOOD PRESSURE: 116 MMHG | BODY MASS INDEX: 28.98 KG/M2 | WEIGHT: 134.31 LBS

## 2018-10-12 DIAGNOSIS — M05.79 RHEUMATOID ARTHRITIS INVOLVING MULTIPLE SITES WITH POSITIVE RHEUMATOID FACTOR (HCC): Primary | ICD-10-CM

## 2018-10-12 DIAGNOSIS — Z51.81 ENCOUNTER FOR THERAPEUTIC DRUG MONITORING: ICD-10-CM

## 2018-10-12 PROCEDURE — 99214 OFFICE O/P EST MOD 30 MIN: CPT | Performed by: INTERNAL MEDICINE

## 2018-10-12 PROCEDURE — 99212 OFFICE O/P EST SF 10 MIN: CPT | Performed by: INTERNAL MEDICINE

## 2018-10-26 ENCOUNTER — TELEPHONE (OUTPATIENT)
Dept: FAMILY MEDICINE CLINIC | Facility: CLINIC | Age: 43
End: 2018-10-26

## 2018-10-26 NOTE — TELEPHONE ENCOUNTER
With help of ZENA for translation purposes, informed patient she needs to be seen for follow-up.   She only would like late appts -- she scheduled from 11/12 at 6pm.

## 2018-10-26 NOTE — TELEPHONE ENCOUNTER
----- Message from Carolyn Jurado MD sent at 10/13/2018  3:09 PM CDT -----  Needs f/u appointment to review results.

## 2018-11-12 ENCOUNTER — OFFICE VISIT (OUTPATIENT)
Dept: FAMILY MEDICINE CLINIC | Facility: CLINIC | Age: 43
End: 2018-11-12
Payer: COMMERCIAL

## 2018-11-12 VITALS
OXYGEN SATURATION: 98 % | HEART RATE: 84 BPM | RESPIRATION RATE: 18 BRPM | BODY MASS INDEX: 29 KG/M2 | DIASTOLIC BLOOD PRESSURE: 78 MMHG | SYSTOLIC BLOOD PRESSURE: 118 MMHG | WEIGHT: 134 LBS

## 2018-11-12 DIAGNOSIS — Z23 NEED FOR INFLUENZA VACCINATION: ICD-10-CM

## 2018-11-12 DIAGNOSIS — E11.9 TYPE 2 DIABETES MELLITUS WITHOUT COMPLICATION, WITHOUT LONG-TERM CURRENT USE OF INSULIN (HCC): Primary | ICD-10-CM

## 2018-11-12 DIAGNOSIS — K21.00 GASTROESOPHAGEAL REFLUX DISEASE WITH ESOPHAGITIS: ICD-10-CM

## 2018-11-12 PROCEDURE — 90471 IMMUNIZATION ADMIN: CPT

## 2018-11-12 PROCEDURE — 99214 OFFICE O/P EST MOD 30 MIN: CPT

## 2018-11-12 PROCEDURE — 90686 IIV4 VACC NO PRSV 0.5 ML IM: CPT

## 2018-11-12 RX ORDER — RANITIDINE 150 MG/1
150 TABLET ORAL 2 TIMES DAILY PRN
Qty: 60 TABLET | Refills: 0 | Status: SHIPPED | OUTPATIENT
Start: 2018-11-12 | End: 2018-12-16

## 2018-11-13 NOTE — PATIENT INSTRUCTIONS
Vacunas antigripales para adultos  La gripe (abreviada flu en inglés) es lilo enfermedad causada por un virus de fácil transmisión. La vacuna antigripal (flu shot) ofrece protección contra la gripe.  Lo ideal es que usted se ponga esta vacuna todos los Chirag Hay muchas cepas (tipos) de virus de la gripe. Los expertos en medicina predicen cuáles cepas tienen mayores probabilidades de infectar a la gente todos los Los ovi; las vacunas antigripales se elaboran a partir de estas cepas.  Cuando le ponen lilo vacuna anti · Personas que viven en la misma casa (incluso niños) que alguien que está en un sonja de alto riesgo  Tipos de vacuna contra la gripe  La vacuna antigripal está disponible en forma inyectable.  Fierro proveedor de [facility] determinará cuál tipo es el ad

## 2018-11-13 NOTE — PROGRESS NOTES
HPI:    Patient ID: Rhonda Campa is a 44year old female. Diabetes   She presents for her follow-up diabetic visit. She has type 2 diabetes mellitus. Onset time: few years. Her disease course has been stable.  There are no hypoglycemic associated sy Cardiovascular: Negative for chest pain and palpitations. Gastrointestinal: Positive for abdominal pain.  Negative for abdominal distention, anal bleeding, anorexia, blood in stool, constipation, diarrhea, flatus, hematochezia, melena, nausea and vomiti well-developed and well-nourished. No distress. Cardiovascular: Normal rate, regular rhythm and normal heart sounds. Pulmonary/Chest: Effort normal and breath sounds normal. No respiratory distress. Abdominal: Soft.  Bowel sounds are normal. She exhib

## 2018-11-29 ENCOUNTER — TELEPHONE (OUTPATIENT)
Dept: FAMILY MEDICINE CLINIC | Facility: CLINIC | Age: 43
End: 2018-11-29

## 2018-11-29 RX ORDER — LEVOTHYROXINE SODIUM 88 UG/1
TABLET ORAL
Qty: 90 TABLET | Refills: 1 | Status: SHIPPED | OUTPATIENT
Start: 2018-11-29 | End: 2019-03-07

## 2018-12-16 DIAGNOSIS — K21.00 GASTROESOPHAGEAL REFLUX DISEASE WITH ESOPHAGITIS: ICD-10-CM

## 2018-12-18 RX ORDER — RANITIDINE 150 MG/1
150 TABLET ORAL 2 TIMES DAILY PRN
Qty: 60 TABLET | Refills: 0 | Status: SHIPPED | OUTPATIENT
Start: 2018-12-18 | End: 2019-01-20

## 2019-01-07 RX ORDER — FOLIC ACID 1 MG/1
TABLET ORAL
Qty: 90 TABLET | Refills: 3 | Status: SHIPPED | OUTPATIENT
Start: 2019-01-07 | End: 2019-12-13

## 2019-01-07 NOTE — TELEPHONE ENCOUNTER
UQV:09-50  Last Filled:2-2, #90 with 3 refills  Labs:   Future Appointments   Date Time Provider Marilu Vallecillo   1/21/2019  8:40 AM Radha Rivera MD [address] [facility]   2/13/2019  5:40 PM Theo Pedersen MD [facility] OF [facility]       Please Advise

## 2019-01-19 NOTE — PROGRESS NOTES
Sulema Tinoco is a 23-year-old patient of Dr. George Villeda, with recent onset CCP and RF positive rheumatoid arthritis. Her family is here to interpret her Welsh again today.     Since I saw her October 12th of 2018, she has been down to 2 mg of prednisone e tenderness. There is no rebound and no guarding. Musculoskeletal: She exhibits no edema. There is not synovitis in her elbows, wrists, or hands.  strength is good bilaterally.    There is tenderness to palpation over her right elbow lateral epicond

## 2019-01-20 DIAGNOSIS — K21.00 GASTROESOPHAGEAL REFLUX DISEASE WITH ESOPHAGITIS: ICD-10-CM

## 2019-01-21 ENCOUNTER — OFFICE VISIT (OUTPATIENT)
Dept: RHEUMATOLOGY | Facility: CLINIC | Age: 44
End: 2019-01-21
Payer: COMMERCIAL

## 2019-01-21 VITALS
DIASTOLIC BLOOD PRESSURE: 79 MMHG | HEIGHT: 57 IN | BODY MASS INDEX: 28.95 KG/M2 | SYSTOLIC BLOOD PRESSURE: 120 MMHG | HEART RATE: 72 BPM | WEIGHT: 134.19 LBS

## 2019-01-21 DIAGNOSIS — R74.01 TRANSAMINITIS: ICD-10-CM

## 2019-01-21 DIAGNOSIS — M05.79 RHEUMATOID ARTHRITIS INVOLVING MULTIPLE SITES WITH POSITIVE RHEUMATOID FACTOR (HCC): Primary | ICD-10-CM

## 2019-01-21 DIAGNOSIS — Z51.81 ENCOUNTER FOR THERAPEUTIC DRUG MONITORING: ICD-10-CM

## 2019-01-21 LAB
ABSOLUTE BASOPHILS: 30 CELLS/UL (ref 0–200)
ABSOLUTE EOSINOPHILS: 150 CELLS/UL (ref 15–500)
ABSOLUTE LYMPHOCYTES: 980 CELLS/UL (ref 850–3900)
ABSOLUTE MONOCYTES: 380 CELLS/UL (ref 200–950)
ABSOLUTE NEUTROPHILS: 3460 CELLS/UL (ref 1500–7800)
ALBUMIN: 4 G/DL (ref 3.6–5.1)
AST: 57 U/L (ref 10–30)
BASOPHILS: 0.6 %
C-REACTIVE PROTEIN: 4.1 MG/L
CREATININE: 0.64 MG/DL (ref 0.5–1.1)
EGFR IF AFRICN AM: 127 ML/MIN/1.73M2
EGFR IF NONAFRICN AM: 109 ML/MIN/1.73M2
EOSINOPHILS: 3 %
HEMATOCRIT: 35.3 % (ref 35–45)
HEMOGLOBIN: 11 G/DL (ref 11.7–15.5)
LYMPHOCYTES: 19.6 %
MCH: 24.4 PG (ref 27–33)
MCHC: 31.2 G/DL (ref 32–36)
MCV: 78.4 FL (ref 80–100)
MONOCYTES: 7.6 %
MPV: 11.1 FL (ref 7.5–12.5)
NEUTROPHILS: 69.2 %
PLATELET COUNT: 310 THOUSAND/UL (ref 140–400)
RDW: 15.2 % (ref 11–15)
RED BLOOD CELL COUNT: 4.5 MILLION/UL (ref 3.8–5.1)
SED RATE BY MODIFIED$WESTERGREN: 19 MM/H
WHITE BLOOD CELL COUNT: 5 THOUSAND/UL (ref 3.8–10.8)

## 2019-01-21 PROCEDURE — 99212 OFFICE O/P EST SF 10 MIN: CPT | Performed by: INTERNAL MEDICINE

## 2019-01-21 PROCEDURE — 99214 OFFICE O/P EST MOD 30 MIN: CPT | Performed by: INTERNAL MEDICINE

## 2019-01-21 RX ORDER — PREDNISONE 1 MG/1
TABLET ORAL
Qty: 90 TABLET | Refills: 3 | Status: SHIPPED | OUTPATIENT
Start: 2019-01-21 | End: 2019-12-13

## 2019-01-21 RX ORDER — RANITIDINE 150 MG/1
150 TABLET ORAL 2 TIMES DAILY PRN
Qty: 60 TABLET | Refills: 0 | Status: SHIPPED | OUTPATIENT
Start: 2019-01-21 | End: 2019-05-15

## 2019-01-28 NOTE — TELEPHONE ENCOUNTER
Script was sent on 1/21/19. It is ready for  per pharmacy. Pharmacy to contact pt to  [address] script.

## 2019-02-04 ENCOUNTER — TELEPHONE (OUTPATIENT)
Dept: FAMILY MEDICINE CLINIC | Facility: CLINIC | Age: 44
End: 2019-02-04

## 2019-02-04 DIAGNOSIS — E11.9 TYPE 2 DIABETES MELLITUS WITHOUT COMPLICATION, WITHOUT LONG-TERM CURRENT USE OF INSULIN (HCC): Primary | ICD-10-CM

## 2019-02-04 NOTE — TELEPHONE ENCOUNTER
Spoke to   Orders faxed to [facility] in [place] on [address] phone number [phone]   Fax [phone]

## 2019-02-04 NOTE — TELEPHONE ENCOUNTER
Patient is calling stating she has an appointment next week with Dr. Merrilee Osgood. She is calling to get lab orders entered to get blood work done. She will be going to [facility] lab in [place]. Patient will call once she is there to fax over orders.

## 2019-02-13 ENCOUNTER — OFFICE VISIT (OUTPATIENT)
Dept: FAMILY MEDICINE CLINIC | Facility: CLINIC | Age: 44
End: 2019-02-13
Payer: COMMERCIAL

## 2019-02-13 VITALS
BODY MASS INDEX: 28.48 KG/M2 | HEIGHT: 57 IN | OXYGEN SATURATION: 98 % | SYSTOLIC BLOOD PRESSURE: 114 MMHG | WEIGHT: 132 LBS | HEART RATE: 84 BPM | RESPIRATION RATE: 12 BRPM | DIASTOLIC BLOOD PRESSURE: 70 MMHG

## 2019-02-13 DIAGNOSIS — M05.79 RHEUMATOID ARTHRITIS INVOLVING MULTIPLE SITES WITH POSITIVE RHEUMATOID FACTOR (HCC): ICD-10-CM

## 2019-02-13 DIAGNOSIS — E03.9 HYPOTHYROIDISM, UNSPECIFIED TYPE: ICD-10-CM

## 2019-02-13 DIAGNOSIS — E11.9 TYPE 2 DIABETES MELLITUS WITHOUT COMPLICATION, WITHOUT LONG-TERM CURRENT USE OF INSULIN (HCC): Primary | ICD-10-CM

## 2019-02-13 PROCEDURE — 99214 OFFICE O/P EST MOD 30 MIN: CPT | Performed by: FAMILY MEDICINE

## 2019-02-13 RX ORDER — NAPROXEN 500 MG/1
TABLET ORAL
Qty: 60 TABLET | Refills: 1 | Status: SHIPPED | OUTPATIENT
Start: 2019-02-13 | End: 2019-03-16

## 2019-02-14 LAB
CHOL/HDLC RATIO: 4.2 (CALC)
CHOLESTEROL, TOTAL: 175 MG/DL
HDL CHOLESTEROL: 42 MG/DL
LDL-CHOLESTEROL: 112 MG/DL (CALC)
NON-HDL CHOLESTEROL: 133 MG/DL (CALC)
TRIGLYCERIDES: 106 MG/DL

## 2019-02-18 NOTE — PROGRESS NOTES
HPI:   Patient presents with:  Diabetes      Jailyn Tracy is a 38year old female presenting for:  RA  -pain resolved with rheum meds  -stable     Hypothyroid  -compliant w/ meds  -asymptomatic     DM  -monitoring diet  -does not check BS  -has not Tab Take one Q AM. Disp: 90 tablet Rfl: 3   folic acid 1 MG Oral Tab TAKE 1 TABLET BY MOUTH DAILY Disp: 90 tablet Rfl: 3   METFORMIN HCL 1000 MG Oral Tab TOME 1 TABLETA (1000MG) DOS VECES AL GLENIS CON COMIDA PARA LA DIABETES Disp: 180 tablet Rfl: 1   Levothy Oropharynx is clear and moist.   Eyes: Conjunctivae are normal. Pupils are equal, round, and reactive to light. Cardiovascular: Normal rate, regular rhythm and normal heart sounds. No murmur heard.   Pulmonary/Chest: Effort normal and breath sounds norm No orders of the defined types were placed in this encounter.       Imaging & Consults:  OPTOMETRY - INTERNAL    Health Maintenance:  Diabetes Care Dilated Eye Exam due on 01/20/1976  Annual Depression Screen due on 01/20/1988  Diabetes Care A1C due o

## 2019-02-22 ENCOUNTER — TELEPHONE (OUTPATIENT)
Dept: FAMILY MEDICINE CLINIC | Facility: CLINIC | Age: 44
End: 2019-02-22

## 2019-02-22 NOTE — TELEPHONE ENCOUNTER
Called pt informed her elevated cholesterol strict diet and exercise and continue same dose medication, I [facility] per patient she would get labs done tomorrow

## 2019-02-22 NOTE — TELEPHONE ENCOUNTER
----- Message from Eunice Salcedo MD sent at 2/22/2019  1:47 PM CST -----  Let her know that her bad cholesterol slightly went up. No dose adjustments at this time. Work on diet and exercise.   Her labs were incomplete as diabetes, CMP and TSH wer

## 2019-02-24 LAB
ALBUMIN/GLOBULIN RATIO: 1.3 (CALC) (ref 1–2.5)
ALBUMIN: 4.2 G/DL (ref 3.6–5.1)
ALKALINE PHOSPHATASE: 82 U/L (ref 33–115)
ALT: 104 U/L (ref 6–29)
AST: 58 U/L (ref 10–30)
BILIRUBIN, TOTAL: 0.4 MG/DL (ref 0.2–1.2)
BUN: 11 MG/DL (ref 7–25)
CALCIUM: 9.4 MG/DL (ref 8.6–10.2)
CARBON DIOXIDE: 28 MMOL/L (ref 20–32)
CHLORIDE: 101 MMOL/L (ref 98–110)
CHOL/HDLC RATIO: 4.3 (CALC)
CHOLESTEROL, TOTAL: 202 MG/DL
CREATININE: 0.58 MG/DL (ref 0.5–1.1)
EGFR IF AFRICN AM: 131 ML/MIN/1.73M2
EGFR IF NONAFRICN AM: 113 ML/MIN/1.73M2
GLOBULIN: 3.2 G/DL (CALC) (ref 1.9–3.7)
GLUCOSE: 105 MG/DL (ref 65–99)
HDL CHOLESTEROL: 47 MG/DL
HEMOGLOBIN A1C: 7.7 % OF TOTAL HGB
LDL-CHOLESTEROL: 130 MG/DL (CALC)
NON-HDL CHOLESTEROL: 155 MG/DL (CALC)
POTASSIUM: 4.4 MMOL/L (ref 3.5–5.3)
PROTEIN, TOTAL: 7.4 G/DL (ref 6.1–8.1)
SODIUM: 137 MMOL/L (ref 135–146)
T4, FREE: 1.6 NG/DL (ref 0.8–1.8)
TRIGLYCERIDES: 134 MG/DL
TSH W/REFLEX TO FT4: 0.21 MIU/L

## 2019-03-07 ENCOUNTER — TELEPHONE (OUTPATIENT)
Dept: FAMILY MEDICINE CLINIC | Facility: CLINIC | Age: 44
End: 2019-03-07

## 2019-03-07 DIAGNOSIS — E78.5 HYPERLIPIDEMIA, UNSPECIFIED HYPERLIPIDEMIA TYPE: ICD-10-CM

## 2019-03-07 DIAGNOSIS — E11.9 TYPE 2 DIABETES MELLITUS WITHOUT COMPLICATION, WITHOUT LONG-TERM CURRENT USE OF INSULIN (HCC): Primary | ICD-10-CM

## 2019-03-07 DIAGNOSIS — E03.9 HYPOTHYROIDISM, UNSPECIFIED TYPE: ICD-10-CM

## 2019-03-07 DIAGNOSIS — R74.01 TRANSAMINITIS: ICD-10-CM

## 2019-03-07 RX ORDER — ATORVASTATIN CALCIUM 20 MG/1
20 TABLET, FILM COATED ORAL NIGHTLY
Qty: 90 TABLET | Refills: 0 | Status: SHIPPED | OUTPATIENT
Start: 2019-03-07 | End: 2019-05-28

## 2019-03-07 RX ORDER — LEVOTHYROXINE SODIUM 0.07 MG/1
TABLET ORAL
Qty: 90 TABLET | Refills: 0 | Status: SHIPPED | OUTPATIENT
Start: 2019-03-07 | End: 2019-05-28

## 2019-03-07 NOTE — TELEPHONE ENCOUNTER
Called pt  verified, results below given,  Pt will call us in 3 months so we can fax lab orders to [facility]

## 2019-03-07 NOTE — TELEPHONE ENCOUNTER
----- Message from Rolanda Mayes MD sent at 3/7/2019 11:29 AM CST -----  Can you please let her know  1) Diabetes unchanged, still uncontrolled at 7.7%. I'm going to add januvia to her metformin. So I'm now switching it to janumet.  If insurance

## 2019-03-08 ENCOUNTER — TELEPHONE (OUTPATIENT)
Dept: FAMILY MEDICINE CLINIC | Facility: CLINIC | Age: 44
End: 2019-03-08

## 2019-03-08 NOTE — TELEPHONE ENCOUNTER
Received fax that the [place]  has been rejected by insurance. Ok per MD to separate the combination:  Sitaglipitin 100mg daily  Metformin 1000 BID    Will send new RX to pharmacy.

## 2019-03-16 ENCOUNTER — TELEPHONE (OUTPATIENT)
Dept: FAMILY MEDICINE CLINIC | Facility: CLINIC | Age: 44
End: 2019-03-16

## 2019-03-16 RX ORDER — NAPROXEN 500 MG/1
TABLET ORAL
Qty: 60 TABLET | Refills: 0 | Status: SHIPPED | OUTPATIENT
Start: 2019-03-16 | End: 2020-06-05

## 2019-04-16 NOTE — PROGRESS NOTES
Nunu Arellano is a 67-year-old patient of Dr. Ricky Goltz, with recent onset CCP and RF positive rheumatoid arthritis. Her family is here to interpret her Swedish again today.     Since I saw her January 21st of 2019, she has been down to 1 mg of prednisone e synovitis across her hand MCP joints.  strength is good bilaterally. Lymphadenopathy:     She has no cervical adenopathy. Neurological: She is alert and oriented to person, place, and time. Skin: No rash noted.    Psychiatric: She has a normal

## 2019-04-19 ENCOUNTER — OFFICE VISIT (OUTPATIENT)
Dept: RHEUMATOLOGY | Facility: CLINIC | Age: 44
End: 2019-04-19
Payer: COMMERCIAL

## 2019-04-19 VITALS
WEIGHT: 130 LBS | DIASTOLIC BLOOD PRESSURE: 72 MMHG | HEART RATE: 73 BPM | SYSTOLIC BLOOD PRESSURE: 110 MMHG | BODY MASS INDEX: 28.05 KG/M2 | HEIGHT: 57 IN

## 2019-04-19 DIAGNOSIS — R74.01 TRANSAMINITIS: ICD-10-CM

## 2019-04-19 DIAGNOSIS — Z51.81 THERAPEUTIC DRUG MONITORING: ICD-10-CM

## 2019-04-19 DIAGNOSIS — M06.9 RHEUMATOID ARTHRITIS INVOLVING MULTIPLE SITES, UNSPECIFIED RHEUMATOID FACTOR PRESENCE: Primary | ICD-10-CM

## 2019-04-19 DIAGNOSIS — E11.9 TYPE 2 DIABETES MELLITUS WITHOUT COMPLICATION, WITHOUT LONG-TERM CURRENT USE OF INSULIN (HCC): ICD-10-CM

## 2019-04-19 DIAGNOSIS — Z51.81 ENCOUNTER FOR THERAPEUTIC DRUG MONITORING: ICD-10-CM

## 2019-04-19 DIAGNOSIS — M05.79 RHEUMATOID ARTHRITIS INVOLVING MULTIPLE SITES WITH POSITIVE RHEUMATOID FACTOR (HCC): Primary | ICD-10-CM

## 2019-04-19 PROCEDURE — 99212 OFFICE O/P EST SF 10 MIN: CPT | Performed by: INTERNAL MEDICINE

## 2019-04-19 PROCEDURE — 99214 OFFICE O/P EST MOD 30 MIN: CPT | Performed by: INTERNAL MEDICINE

## 2019-04-19 RX ORDER — HYDROXYCHLOROQUINE SULFATE 200 MG/1
TABLET, FILM COATED ORAL
Qty: 60 TABLET | Refills: 5 | Status: SHIPPED | OUTPATIENT
Start: 2019-04-19 | End: 2019-11-02

## 2019-04-23 ENCOUNTER — TELEPHONE (OUTPATIENT)
Dept: FAMILY MEDICINE CLINIC | Facility: CLINIC | Age: 44
End: 2019-04-23

## 2019-04-23 NOTE — TELEPHONE ENCOUNTER
Pt called requesting a refill on Linagliptin 5mg, pt stated she is taking it BID. Please sned to [facility] pharmacy.

## 2019-05-11 ENCOUNTER — HOSPITAL ENCOUNTER (OUTPATIENT)
Dept: ULTRASOUND IMAGING | Facility: HOSPITAL | Age: 44
Discharge: HOME | End: 2019-05-11
Attending: INTERNAL MEDICINE
Payer: COMMERCIAL

## 2019-05-11 DIAGNOSIS — E11.9 TYPE 2 DIABETES MELLITUS WITHOUT COMPLICATION, WITHOUT LONG-TERM CURRENT USE OF INSULIN (HCC): ICD-10-CM

## 2019-05-11 DIAGNOSIS — R74.01 TRANSAMINITIS: ICD-10-CM

## 2019-05-11 PROCEDURE — 76705 ECHO EXAM OF ABDOMEN: CPT | Performed by: INTERNAL MEDICINE

## 2019-05-15 ENCOUNTER — OFFICE VISIT (OUTPATIENT)
Dept: FAMILY MEDICINE CLINIC | Facility: CLINIC | Age: 44
End: 2019-05-15
Payer: COMMERCIAL

## 2019-05-15 VITALS
BODY MASS INDEX: 28.05 KG/M2 | HEART RATE: 102 BPM | WEIGHT: 130 LBS | DIASTOLIC BLOOD PRESSURE: 70 MMHG | OXYGEN SATURATION: 99 % | SYSTOLIC BLOOD PRESSURE: 112 MMHG | RESPIRATION RATE: 13 BRPM | HEIGHT: 57 IN

## 2019-05-15 DIAGNOSIS — E78.2 MIXED HYPERLIPIDEMIA: Primary | ICD-10-CM

## 2019-05-15 PROCEDURE — 99214 OFFICE O/P EST MOD 30 MIN: CPT | Performed by: FAMILY MEDICINE

## 2019-05-20 NOTE — PROGRESS NOTES
HPI:   Patient presents with:  Diabetes: DM follow up       Dossikenisha Bueno is a 38year old female presenting for:  RA  -pain stable with rheum meds  -seeing Rheum; down to prednisone 1mg daily     Hypothyroid  -compliant w/ meds  -asymptomatic     DM A1C 8.0 (H) 05/16/2019 08:21 AM      Lab Results   Component Value Date/Time    CHOLEST 125 05/16/2019 08:21 AM    HDL 41 (L) 05/16/2019 08:21 AM    TRIG 68 05/16/2019 08:21 AM    LDL 70 05/16/2019 08:21 AM    NONHDLC 84 05/16/2019 08:21 AM       Lab Resul 120 mL Rfl: 1      Past Medical History:   Diagnosis Date   • Patient denies medical problems          No past surgical history on file.   No Known Allergies   Social History:  Social History    Tobacco Use      Smoking status: Never Smoker      Smokeless t without complication, without long-term current use of insulin ([facility] [address]  Diabetic control is poorly controlled.   Latest Hgb A1c: 7.7  Recommendations are:   · Will CPM pending bloodwork  · Check HgbA1C, CMP,fasting lipids  · Advised weight and diabetic/lipid Medium Risk Adult Completed      Mynor Chavez MD

## 2019-05-24 DIAGNOSIS — R74.01 TRANSAMINITIS: ICD-10-CM

## 2019-05-24 DIAGNOSIS — E78.2 MIXED HYPERLIPIDEMIA: ICD-10-CM

## 2019-05-24 DIAGNOSIS — E11.9 TYPE 2 DIABETES MELLITUS WITHOUT COMPLICATION, WITHOUT LONG-TERM CURRENT USE OF INSULIN (HCC): Primary | ICD-10-CM

## 2019-05-28 ENCOUNTER — TELEPHONE (OUTPATIENT)
Dept: FAMILY MEDICINE CLINIC | Facility: CLINIC | Age: 44
End: 2019-05-28

## 2019-05-28 RX ORDER — LEVOTHYROXINE SODIUM 0.07 MG/1
TABLET ORAL
Qty: 90 TABLET | Refills: 0 | Status: SHIPPED | OUTPATIENT
Start: 2019-05-28 | End: 2019-08-28

## 2019-05-28 RX ORDER — ATORVASTATIN CALCIUM 20 MG/1
20 TABLET, FILM COATED ORAL NIGHTLY
Qty: 90 TABLET | Refills: 0 | Status: SHIPPED | OUTPATIENT
Start: 2019-05-28 | End: 2019-08-28

## 2019-05-28 NOTE — TELEPHONE ENCOUNTER
----- Message from Ridge Sheehan MD sent at 5/24/2019  2:55 PM CDT -----  Please let her know. 1) cholesterol  Is good.  Continue meds  2) liver enzymes improving  2) thyroid normal  4) diabetes went up to 8% but that's secondary to her stoppin

## 2019-07-31 ENCOUNTER — TELEPHONE (OUTPATIENT)
Dept: RHEUMATOLOGY | Facility: CLINIC | Age: 44
End: 2019-07-31

## 2019-07-31 NOTE — TELEPHONE ENCOUNTER
Pt  will call back to schedule appt. Informed him to let pt know to have labs completed prior to appt.

## 2019-08-15 ENCOUNTER — TELEPHONE (OUTPATIENT)
Dept: RHEUMATOLOGY | Facility: CLINIC | Age: 44
End: 2019-08-15

## 2019-08-15 DIAGNOSIS — M05.79 SEROPOSITIVE RHEUMATOID ARTHRITIS OF MULTIPLE SITES (HCC): Primary | ICD-10-CM

## 2019-08-15 DIAGNOSIS — Z51.81 ENCOUNTER FOR THERAPEUTIC DRUG MONITORING: ICD-10-CM

## 2019-08-15 NOTE — TELEPHONE ENCOUNTER
Daughter made an appt for the pt to see Dr. Loren Samuel on 9-6-19 and she is requesting order to do blood work prior to the appointment. Per daughter pt gets the blood work done at another lab. Pt can be reached at [phone].

## 2019-08-27 LAB
ABSOLUTE BASOPHILS: 42 CELLS/UL (ref 0–200)
ABSOLUTE EOSINOPHILS: 122 CELLS/UL (ref 15–500)
ABSOLUTE LYMPHOCYTES: 1108 CELLS/UL (ref 850–3900)
ABSOLUTE MONOCYTES: 339 CELLS/UL (ref 200–950)
ABSOLUTE NEUTROPHILS: 3689 CELLS/UL (ref 1500–7800)
ALBUMIN/GLOBULIN RATIO: 1.3 (CALC) (ref 1–2.5)
ALBUMIN: 4.1 G/DL (ref 3.6–5.1)
ALBUMIN: 4.1 G/DL (ref 3.6–5.1)
ALKALINE PHOSPHATASE: 73 U/L (ref 33–115)
ALT: 28 U/L (ref 6–29)
AST: 19 U/L (ref 10–30)
AST: 19 U/L (ref 10–30)
BASOPHILS: 0.8 %
BILIRUBIN, TOTAL: 0.3 MG/DL (ref 0.2–1.2)
BUN: 10 MG/DL (ref 7–25)
C-REACTIVE PROTEIN: 1.4 MG/L
CALCIUM: 8.9 MG/DL (ref 8.6–10.2)
CARBON DIOXIDE: 24 MMOL/L (ref 20–32)
CHLORIDE: 105 MMOL/L (ref 98–110)
CHOL/HDLC RATIO: 3.2 (CALC)
CHOLESTEROL, TOTAL: 122 MG/DL
CREATININE: 0.61 MG/DL (ref 0.5–1.1)
CREATININE: 0.63 MG/DL (ref 0.5–1.1)
EGFR IF AFRICN AM: 127 ML/MIN/1.73M2
EGFR IF AFRICN AM: 129 ML/MIN/1.73M2
EGFR IF NONAFRICN AM: 110 ML/MIN/1.73M2
EGFR IF NONAFRICN AM: 111 ML/MIN/1.73M2
EOSINOPHILS: 2.3 %
GLOBULIN: 3.1 G/DL (CALC) (ref 1.9–3.7)
GLUCOSE: 105 MG/DL (ref 65–99)
HDL CHOLESTEROL: 38 MG/DL
HEMATOCRIT: 34.3 % (ref 35–45)
HEMOGLOBIN A1C: 6.9 % OF TOTAL HGB
HEMOGLOBIN: 10.2 G/DL (ref 11.7–15.5)
LDL-CHOLESTEROL: 66 MG/DL (CALC)
LYMPHOCYTES: 20.9 %
MCH: 21.8 PG (ref 27–33)
MCHC: 29.7 G/DL (ref 32–36)
MCV: 73.4 FL (ref 80–100)
MONOCYTES: 6.4 %
MPV: 11.1 FL (ref 7.5–12.5)
NEUTROPHILS: 69.6 %
NON-HDL CHOLESTEROL: 84 MG/DL (CALC)
PLATELET COUNT: 331 THOUSAND/UL (ref 140–400)
POTASSIUM: 3.9 MMOL/L (ref 3.5–5.3)
PROTEIN, TOTAL: 7.2 G/DL (ref 6.1–8.1)
RDW: 19.4 % (ref 11–15)
RED BLOOD CELL COUNT: 4.67 MILLION/UL (ref 3.8–5.1)
SED RATE BY MODIFIED$WESTERGREN: 25 MM/H
SODIUM: 137 MMOL/L (ref 135–146)
TRIGLYCERIDES: 94 MG/DL
WHITE BLOOD CELL COUNT: 5.3 THOUSAND/UL (ref 3.8–10.8)

## 2019-08-28 ENCOUNTER — OFFICE VISIT (OUTPATIENT)
Dept: FAMILY MEDICINE CLINIC | Facility: CLINIC | Age: 44
End: 2019-08-28
Payer: COMMERCIAL

## 2019-08-28 VITALS
WEIGHT: 127 LBS | BODY MASS INDEX: 27.4 KG/M2 | SYSTOLIC BLOOD PRESSURE: 118 MMHG | HEART RATE: 70 BPM | DIASTOLIC BLOOD PRESSURE: 70 MMHG | OXYGEN SATURATION: 98 % | RESPIRATION RATE: 13 BRPM | HEIGHT: 57 IN

## 2019-08-28 DIAGNOSIS — E78.2 MIXED HYPERLIPIDEMIA: ICD-10-CM

## 2019-08-28 DIAGNOSIS — M05.79 RHEUMATOID ARTHRITIS INVOLVING MULTIPLE SITES WITH POSITIVE RHEUMATOID FACTOR (HCC): ICD-10-CM

## 2019-08-28 DIAGNOSIS — E11.9 TYPE 2 DIABETES MELLITUS WITHOUT COMPLICATION, WITHOUT LONG-TERM CURRENT USE OF INSULIN (HCC): Primary | ICD-10-CM

## 2019-08-28 DIAGNOSIS — E03.9 HYPOTHYROIDISM, UNSPECIFIED TYPE: ICD-10-CM

## 2019-08-28 DIAGNOSIS — R53.82 CHRONIC FATIGUE: ICD-10-CM

## 2019-08-28 DIAGNOSIS — Z12.39 BREAST CANCER SCREENING: ICD-10-CM

## 2019-08-28 PROCEDURE — 99214 OFFICE O/P EST MOD 30 MIN: CPT | Performed by: FAMILY MEDICINE

## 2019-08-28 RX ORDER — LINAGLIPTIN 5 MG/1
TABLET, FILM COATED ORAL
Refills: 0 | COMMUNITY
Start: 2019-08-13 | End: 2019-08-28

## 2019-08-28 RX ORDER — LINAGLIPTIN 5 MG/1
TABLET, FILM COATED ORAL
Qty: 90 TABLET | Refills: 0 | Status: SHIPPED | OUTPATIENT
Start: 2019-08-28 | End: 2020-01-20

## 2019-08-28 RX ORDER — ATORVASTATIN CALCIUM 20 MG/1
20 TABLET, FILM COATED ORAL NIGHTLY
Qty: 90 TABLET | Refills: 0 | Status: SHIPPED | OUTPATIENT
Start: 2019-08-28 | End: 2019-12-03

## 2019-08-28 RX ORDER — LEVOTHYROXINE SODIUM 0.07 MG/1
75 TABLET ORAL
Qty: 90 TABLET | Refills: 0 | Status: SHIPPED | OUTPATIENT
Start: 2019-08-28 | End: 2019-12-02

## 2019-08-28 NOTE — PROGRESS NOTES
HPI:   Patient presents with:  Diabetes      Nevin Coe is a 38year old female presenting for:  RA  -pain stable with rheum meds  -seeing Rheum; down to prednisone 1mg daily     Hypothyroid/H:D  -compliant w/ meds  -asymptomatic     DM  -monitori 08/26/2019 07:38 AM    LDL 66 08/26/2019 07:38 AM    NONHDLC 84 08/26/2019 07:38 AM       Lab Results   Component Value Date/Time     (H) 08/26/2019 07:38 AM     08/26/2019 07:38 AM    K 3.9 08/26/2019 07:38 AM     08/26/2019 07:38 AM Family History:  Family History   Problem Relation Age of Onset   • No Known Problems Father    • No Known Problems Mother    • No Known Problems Daughter    • No Known Problems Son           REVIEW OF SYSTEMS:   Review of Systems   Constitutional: Sen [place] (14)  -     HEMOGLOBIN A1C  -     MICROALB/CREAT RATIO, RANDOM URINE  Diabetic control is well-controlled. Latest Hgb A1c: 6.9%  Recommendations are:   · Will CPM   · Check HgbA1C, CMP in 3mo.  Check microlbumin  · Advised weight and diabetic/lipid managem 01/20/1988  Diabetes Care A1C due on 04/12/2019  Diabetes Care Nephropathy Screening due on 06/19/2019  Annual Physical due on 08/14/2019  Mammogram due on 09/18/2019  LDL Control due on 02/13/2020  Pap Smear,3 Years due on 08/14/2021  Influenza Vaccine Co

## 2019-08-29 LAB
CREATININE, RANDOM URINE: 94 MG/DL (ref 20–275)
MICROALBUMIN/CREATININE RATIO, RANDOM URINE: 3 MCG/MG CREAT
MICROALBUMIN: 0.3 MG/DL

## 2019-08-30 RX ORDER — KETOCONAZOLE 20 MG/ML
SHAMPOO TOPICAL
Qty: 120 ML | Refills: 1 | Status: SHIPPED | OUTPATIENT
Start: 2019-08-30

## 2019-08-30 NOTE — TELEPHONE ENCOUNTER
----- Message from Gisele Arevalo MD sent at 8/29/2019  4:52 PM CDT -----  Please let her know that her urine/kidney exam is normal

## 2019-09-03 NOTE — PROGRESS NOTES
Janine Puga is a 52-year-old patient of Dr. Rosa Perrin, with CCP and RF positive rheumatoid arthritis. Her son is here to interpret her Bulgarian again today.     Since I last saw her April 19th of 2019, she has remained on 1 mg of prednisone every morning, Lymphadenopathy:     She has no cervical adenopathy. Neurological: She is alert and oriented to person, place, and time. Skin: No rash noted. Psychiatric: She has a normal mood and affect.      ASSESSMENT/PLAN:     1. CCP and RF positive rheumatoid

## 2019-09-06 ENCOUNTER — OFFICE VISIT (OUTPATIENT)
Dept: RHEUMATOLOGY | Facility: CLINIC | Age: 44
End: 2019-09-06
Payer: COMMERCIAL

## 2019-09-06 VITALS
HEIGHT: 57 IN | WEIGHT: 126.69 LBS | HEART RATE: 84 BPM | SYSTOLIC BLOOD PRESSURE: 120 MMHG | BODY MASS INDEX: 27.33 KG/M2 | DIASTOLIC BLOOD PRESSURE: 77 MMHG

## 2019-09-06 DIAGNOSIS — M05.79 RHEUMATOID ARTHRITIS INVOLVING MULTIPLE SITES WITH POSITIVE RHEUMATOID FACTOR (HCC): Primary | ICD-10-CM

## 2019-09-06 DIAGNOSIS — Z51.81 ENCOUNTER FOR THERAPEUTIC DRUG MONITORING: ICD-10-CM

## 2019-09-06 DIAGNOSIS — D50.0 IRON DEFICIENCY ANEMIA DUE TO CHRONIC BLOOD LOSS: ICD-10-CM

## 2019-09-06 PROCEDURE — 99212 OFFICE O/P EST SF 10 MIN: CPT | Performed by: INTERNAL MEDICINE

## 2019-09-06 PROCEDURE — 99214 OFFICE O/P EST MOD 30 MIN: CPT | Performed by: INTERNAL MEDICINE

## 2019-11-04 RX ORDER — HYDROXYCHLOROQUINE SULFATE 200 MG/1
TABLET, FILM COATED ORAL
Qty: 180 TABLET | Refills: 1 | Status: SHIPPED | OUTPATIENT
Start: 2019-11-04

## 2019-11-04 NOTE — TELEPHONE ENCOUNTER
LOV: 9/6/2019  Future Appointments   Date Time Provider Marilu Vallecillo   11/27/2019  6:00 PM Bernadette Guillen MD [address]   12/13/2019  8:40 AM Wing Thai MD [address] [facility]     Labs:   Component      Latest Ref Rng & Unit

## 2019-11-27 ENCOUNTER — OFFICE VISIT (OUTPATIENT)
Dept: FAMILY MEDICINE CLINIC | Facility: CLINIC | Age: 44
End: 2019-11-27
Payer: COMMERCIAL

## 2019-11-27 VITALS
HEART RATE: 100 BPM | WEIGHT: 125 LBS | BODY MASS INDEX: 26.97 KG/M2 | OXYGEN SATURATION: 98 % | HEIGHT: 57 IN | DIASTOLIC BLOOD PRESSURE: 60 MMHG | SYSTOLIC BLOOD PRESSURE: 110 MMHG

## 2019-11-27 DIAGNOSIS — Z00.00 WELLNESS EXAMINATION: Primary | ICD-10-CM

## 2019-11-27 DIAGNOSIS — E78.2 MIXED HYPERLIPIDEMIA: ICD-10-CM

## 2019-11-27 DIAGNOSIS — E03.9 HYPOTHYROIDISM, UNSPECIFIED TYPE: ICD-10-CM

## 2019-11-27 DIAGNOSIS — M05.79 RHEUMATOID ARTHRITIS INVOLVING MULTIPLE SITES WITH POSITIVE RHEUMATOID FACTOR (HCC): ICD-10-CM

## 2019-11-27 DIAGNOSIS — Z23 NEED FOR VACCINATION: ICD-10-CM

## 2019-11-27 DIAGNOSIS — E11.9 TYPE 2 DIABETES MELLITUS WITHOUT COMPLICATION, WITHOUT LONG-TERM CURRENT USE OF INSULIN (HCC): ICD-10-CM

## 2019-11-27 DIAGNOSIS — Z00.00 HEALTHCARE MAINTENANCE: ICD-10-CM

## 2019-11-27 PROCEDURE — 99213 OFFICE O/P EST LOW 20 MIN: CPT | Performed by: FAMILY MEDICINE

## 2019-11-27 PROCEDURE — 99396 PREV VISIT EST AGE 40-64: CPT | Performed by: FAMILY MEDICINE

## 2019-11-27 PROCEDURE — 90686 IIV4 VACC NO PRSV 0.5 ML IM: CPT | Performed by: FAMILY MEDICINE

## 2019-11-27 PROCEDURE — 90471 IMMUNIZATION ADMIN: CPT | Performed by: FAMILY MEDICINE

## 2019-11-28 NOTE — PROGRESS NOTES
CC: Annual Physical Exam    HPI:   Britta Matos is a 38year old female who presents for a complete physical exam.    HCM  -Diet:  Well-balanced.   -Exercise regularly  -Mental Health: denies any depression or anxiety sx  -Skin care:  no concerning le Tab Pershing 1 tableta (500mg) dos jordan al shawna si lo necesita para el dolor 60 tablet 0   • predniSONE 1 MG Oral Tab Take one Q AM. 90 tablet 3   • folic acid 1 MG Oral Tab TAKE 1 TABLET BY MOUTH DAILY 90 tablet 3   • ATORVASTATIN 20 MG Oral Tab TOME MALGORZATA TABL atraumatic. Right Ear: Tympanic membrane normal.   Left Ear: Tympanic membrane normal.   Mouth/Throat: Oropharynx is clear and moist and mucous membranes are normal.   Eyes: Pupils are equal, round, and reactive to light.  Conjunctivae are normal.   Neck: prn.    Hypothyroidism, unspecified type  -stable  -continue meds  '-obtain standing bloodwork    Mixed hyperlipidemia  -stable  -continue meds  -obtain standing bloodwork       Diabetes Care Dilated Eye Exam due on 01/20/1976  Annual Depression Screen due

## 2019-12-02 DIAGNOSIS — E03.9 HYPOTHYROIDISM, UNSPECIFIED TYPE: ICD-10-CM

## 2019-12-02 RX ORDER — LEVOTHYROXINE SODIUM 0.07 MG/1
TABLET ORAL
Qty: 90 TABLET | Refills: 0 | Status: SHIPPED | OUTPATIENT
Start: 2019-12-02 | End: 2020-02-26

## 2019-12-03 DIAGNOSIS — E78.2 MIXED HYPERLIPIDEMIA: ICD-10-CM

## 2019-12-03 RX ORDER — ATORVASTATIN CALCIUM 20 MG/1
TABLET, FILM COATED ORAL
Qty: 90 TABLET | Refills: 0 | Status: SHIPPED | OUTPATIENT
Start: 2019-12-03 | End: 2020-02-26

## 2019-12-07 NOTE — PROGRESS NOTES
Flores Donahue is a 28-year-old patient of Dr. Zadie Hamman, with CCP and RF positive rheumatoid arthritis. Her son is here to interpret her Belarusian again today.     Since I last saw her September 6th of 2019, she has remained on 1 mg of prednisone every mornin Neurological: She is alert and oriented to person, place, and time. Skin: No rash noted. Psychiatric: She has a normal mood and affect. ASSESSMENT/PLAN:     1. CCP and RF positive rheumatoid arthritis. Doing very well. She will stop prednisone.

## 2019-12-13 ENCOUNTER — OFFICE VISIT (OUTPATIENT)
Dept: RHEUMATOLOGY | Facility: CLINIC | Age: 44
End: 2019-12-13
Payer: COMMERCIAL

## 2019-12-13 VITALS
WEIGHT: 122 LBS | RESPIRATION RATE: 16 BRPM | DIASTOLIC BLOOD PRESSURE: 68 MMHG | BODY MASS INDEX: 26.32 KG/M2 | HEIGHT: 57 IN | HEART RATE: 78 BPM | SYSTOLIC BLOOD PRESSURE: 102 MMHG

## 2019-12-13 DIAGNOSIS — M05.79 RHEUMATOID ARTHRITIS INVOLVING MULTIPLE SITES WITH POSITIVE RHEUMATOID FACTOR (HCC): Primary | ICD-10-CM

## 2019-12-13 DIAGNOSIS — Z51.81 ENCOUNTER FOR THERAPEUTIC DRUG MONITORING: ICD-10-CM

## 2019-12-13 PROCEDURE — 99212 OFFICE O/P EST SF 10 MIN: CPT | Performed by: INTERNAL MEDICINE

## 2019-12-13 PROCEDURE — 99214 OFFICE O/P EST MOD 30 MIN: CPT | Performed by: INTERNAL MEDICINE

## 2019-12-13 RX ORDER — FOLIC ACID 1 MG/1
TABLET ORAL
Qty: 90 TABLET | Refills: 3 | Status: SHIPPED | OUTPATIENT
Start: 2019-12-13 | End: 2020-06-03

## 2020-01-19 DIAGNOSIS — E11.9 TYPE 2 DIABETES MELLITUS WITHOUT COMPLICATION, WITHOUT LONG-TERM CURRENT USE OF INSULIN (HCC): ICD-10-CM

## 2020-01-20 RX ORDER — LINAGLIPTIN 5 MG/1
TABLET, FILM COATED ORAL
Qty: 30 TABLET | Refills: 0 | Status: SHIPPED | OUTPATIENT
Start: 2020-01-20 | End: 2020-02-26

## 2020-01-29 DIAGNOSIS — E11.9 TYPE 2 DIABETES MELLITUS WITHOUT COMPLICATION, WITHOUT LONG-TERM CURRENT USE OF INSULIN (HCC): ICD-10-CM

## 2020-02-05 DIAGNOSIS — E78.2 MIXED HYPERLIPIDEMIA: ICD-10-CM

## 2020-02-05 DIAGNOSIS — E11.9 TYPE 2 DIABETES MELLITUS WITHOUT COMPLICATION, WITHOUT LONG-TERM CURRENT USE OF INSULIN (HCC): Primary | ICD-10-CM

## 2020-02-05 DIAGNOSIS — E03.9 HYPOTHYROIDISM, UNSPECIFIED TYPE: ICD-10-CM

## 2020-02-20 DIAGNOSIS — E11.9 TYPE 2 DIABETES MELLITUS WITHOUT COMPLICATION, WITHOUT LONG-TERM CURRENT USE OF INSULIN (HCC): ICD-10-CM

## 2020-02-20 DIAGNOSIS — K21.00 GASTROESOPHAGEAL REFLUX DISEASE WITH ESOPHAGITIS: ICD-10-CM

## 2020-02-26 ENCOUNTER — OFFICE VISIT (OUTPATIENT)
Dept: FAMILY MEDICINE CLINIC | Facility: CLINIC | Age: 45
End: 2020-02-26
Payer: COMMERCIAL

## 2020-02-26 VITALS
SYSTOLIC BLOOD PRESSURE: 110 MMHG | HEART RATE: 89 BPM | BODY MASS INDEX: 26.97 KG/M2 | WEIGHT: 125 LBS | DIASTOLIC BLOOD PRESSURE: 68 MMHG | OXYGEN SATURATION: 98 % | HEIGHT: 57 IN

## 2020-02-26 DIAGNOSIS — E03.9 HYPOTHYROIDISM, UNSPECIFIED TYPE: ICD-10-CM

## 2020-02-26 DIAGNOSIS — E11.9 TYPE 2 DIABETES MELLITUS WITHOUT COMPLICATION, WITHOUT LONG-TERM CURRENT USE OF INSULIN (HCC): Primary | ICD-10-CM

## 2020-02-26 DIAGNOSIS — M05.79 RHEUMATOID ARTHRITIS INVOLVING MULTIPLE SITES WITH POSITIVE RHEUMATOID FACTOR (HCC): ICD-10-CM

## 2020-02-26 DIAGNOSIS — M62.838 TRAPEZIUS MUSCLE SPASM: ICD-10-CM

## 2020-02-26 DIAGNOSIS — E78.2 MIXED HYPERLIPIDEMIA: ICD-10-CM

## 2020-02-26 PROCEDURE — 99214 OFFICE O/P EST MOD 30 MIN: CPT | Performed by: FAMILY MEDICINE

## 2020-02-26 RX ORDER — LEVOTHYROXINE SODIUM 0.07 MG/1
TABLET ORAL
Qty: 30 TABLET | Refills: 2 | Status: SHIPPED | OUTPATIENT
Start: 2020-02-26 | End: 2020-03-01

## 2020-02-26 RX ORDER — METHYLPREDNISOLONE 4 MG/1
TABLET ORAL
Qty: 1 KIT | Refills: 0 | Status: SHIPPED | OUTPATIENT
Start: 2020-02-26 | End: 2020-06-03

## 2020-02-26 RX ORDER — CYCLOBENZAPRINE HCL 5 MG
5 TABLET ORAL NIGHTLY PRN
Qty: 14 TABLET | Refills: 0 | Status: SHIPPED | OUTPATIENT
Start: 2020-02-26 | End: 2020-03-11

## 2020-02-26 RX ORDER — ATORVASTATIN CALCIUM 20 MG/1
TABLET, FILM COATED ORAL
Qty: 30 TABLET | Refills: 2 | Status: SHIPPED | OUTPATIENT
Start: 2020-02-26 | End: 2020-05-20

## 2020-02-26 RX ORDER — LINAGLIPTIN 5 MG/1
5 TABLET, FILM COATED ORAL DAILY
Qty: 30 TABLET | Refills: 2 | Status: SHIPPED | OUTPATIENT
Start: 2020-02-26 | End: 2020-03-13

## 2020-02-26 RX ORDER — LINAGLIPTIN 5 MG/1
TABLET, FILM COATED ORAL
Qty: 30 TABLET | Refills: 0 | OUTPATIENT
Start: 2020-02-26

## 2020-02-27 NOTE — PROGRESS NOTES
HPI:   Patient presents with:  Diabetes: DM follow up  Reflux: acid reflux medication refill      Juan Avila is a 41year old female presenting for:  Neck pain  -for past 1mo  -feels tense  -no n/t/w or skin changes or trauma    RA  -pain stable w 08/26/2019 07:38 AM     08/26/2019 07:38 AM    K 3.9 08/26/2019 07:38 AM     08/26/2019 07:38 AM    CO2 24 08/26/2019 07:38 AM    CREATSERUM 0.61 08/26/2019 07:38 AM    CA 8.9 08/26/2019 07:38 AM    ALB 4.4 12/12/2019 07:34 AM    TP 7.2 08/26/2 History    Tobacco Use      Smoking status: Never Smoker      Smokeless tobacco: Never Used    Alcohol use: No    Drug use: No     Family History:  Family History   Problem Relation Age of Onset   • No Known Problems Father    • No Known Problems Mother rash noted.            ASSESSMENT AND PLAN:   Patient is a 41year old female who presents primarily presents for:    Diagnoses and all orders for this visit:    Type 2 diabetes mellitus without complication, without long-term current use of insulin ([facility] [address] precautions discussed.     Meds & Refills for this Visit:  Requested Prescriptions     Signed Prescriptions Disp Refills   • atorvastatin 20 MG Oral Tab 30 tablet 2     Sig: TOME MALGORZATA TABLETA POR VIA ORAL CADA NOCHE PARA CHOLESTEROL   • Levothyroxine Sodium

## 2020-02-28 RX ORDER — RANITIDINE 150 MG/1
150 TABLET ORAL 2 TIMES DAILY PRN
Qty: 60 TABLET | Refills: 0 | Status: SHIPPED | OUTPATIENT
Start: 2020-02-28 | End: 2020-06-03

## 2020-03-01 LAB
ALBUMIN/GLOBULIN RATIO: 1.4 (CALC) (ref 1–2.5)
ALBUMIN: 4 G/DL (ref 3.6–5.1)
ALKALINE PHOSPHATASE: 66 U/L (ref 31–125)
ALT: 30 U/L (ref 6–29)
AST: 20 U/L (ref 10–30)
BILIRUBIN, TOTAL: 0.3 MG/DL (ref 0.2–1.2)
BUN: 8 MG/DL (ref 7–25)
CALCIUM: 8.9 MG/DL (ref 8.6–10.2)
CARBON DIOXIDE: 29 MMOL/L (ref 20–32)
CHLORIDE: 103 MMOL/L (ref 98–110)
CHOL/HDLC RATIO: 3.1 (CALC)
CHOLESTEROL, TOTAL: 147 MG/DL
CREATININE: 0.51 MG/DL (ref 0.5–1.1)
EGFR IF AFRICN AM: 136 ML/MIN/1.73M2
EGFR IF NONAFRICN AM: 117 ML/MIN/1.73M2
GLOBULIN: 2.9 G/DL (CALC) (ref 1.9–3.7)
GLUCOSE: 108 MG/DL (ref 65–99)
HDL CHOLESTEROL: 48 MG/DL
HEMOGLOBIN A1C: 6.4 % OF TOTAL HGB
LDL-CHOLESTEROL: 84 MG/DL (CALC)
NON-HDL CHOLESTEROL: 99 MG/DL (CALC)
POTASSIUM: 4.1 MMOL/L (ref 3.5–5.3)
PROTEIN, TOTAL: 6.9 G/DL (ref 6.1–8.1)
SODIUM: 138 MMOL/L (ref 135–146)
T4, FREE: 1.6 NG/DL (ref 0.8–1.8)
TRIGLYCERIDES: 69 MG/DL
TSH W/REFLEX TO FT4: 0.36 MIU/L

## 2020-03-02 ENCOUNTER — TELEPHONE (OUTPATIENT)
Dept: FAMILY MEDICINE CLINIC | Facility: CLINIC | Age: 45
End: 2020-03-02

## 2020-03-02 NOTE — TELEPHONE ENCOUNTER
Patient is calling in regards to the Medrol medication. She states its her 3rd day taking the medication and she feels dizzy and shaky when she takes it. Advised patient to stop taking the medication and wait for a call back from MA.  Will be asking for any

## 2020-03-02 NOTE — TELEPHONE ENCOUNTER
Patient was recommended to take meds after lunch instead of taking them in the morning, she verbalized understanidng and will call back in case she has any other complication.

## 2020-03-03 ENCOUNTER — TELEPHONE (OUTPATIENT)
Dept: FAMILY MEDICINE CLINIC | Facility: CLINIC | Age: 45
End: 2020-03-03

## 2020-03-03 DIAGNOSIS — E78.2 MIXED HYPERLIPIDEMIA: ICD-10-CM

## 2020-03-03 DIAGNOSIS — E03.9 HYPOTHYROIDISM, UNSPECIFIED TYPE: Primary | ICD-10-CM

## 2020-03-03 DIAGNOSIS — E11.9 TYPE 2 DIABETES MELLITUS WITHOUT COMPLICATION, WITHOUT LONG-TERM CURRENT USE OF INSULIN (HCC): ICD-10-CM

## 2020-03-03 NOTE — TELEPHONE ENCOUNTER
----- Message from Yaz Boothe MD sent at 3/1/2020  8:04 PM CST -----  Please let her know DM improving. Continue same meds. Thyroid uncontrolled. Decreasing medicine. Refills sent  Recheck CMP, A1c, TSH in 3mo.  Please enter orders and mail to

## 2020-03-11 NOTE — TELEPHONE ENCOUNTER
Results were discussed with [facility], all questions were answered, she knows she is start new levothyroxine dosage of 50mcg and stop the 75 mcg one. Orders placed for labs in 3 months and mailed home.

## 2020-03-13 ENCOUNTER — TELEPHONE (OUTPATIENT)
Dept: FAMILY MEDICINE CLINIC | Facility: CLINIC | Age: 45
End: 2020-03-13

## 2020-03-13 DIAGNOSIS — E11.9 TYPE 2 DIABETES MELLITUS WITHOUT COMPLICATION, WITHOUT LONG-TERM CURRENT USE OF INSULIN (HCC): ICD-10-CM

## 2020-03-13 RX ORDER — LINAGLIPTIN 5 MG/1
5 TABLET, FILM COATED ORAL DAILY
Qty: 30 TABLET | Refills: 2 | Status: SHIPPED | OUTPATIENT
Start: 2020-03-13 | End: 2020-06-03

## 2020-03-13 NOTE — TELEPHONE ENCOUNTER
Patient is looking to get her refills on her TRADJENTA 5 MG Oral Tab to be resend to [facility]. She lost her insurance and [facility] is cheaper to get the medication. Please advice when authorized.

## 2020-03-17 NOTE — TELEPHONE ENCOUNTER
Pt called regarding previous message medication, she stated that the price of this medication is still the same, expensive. The doctor mentioned to her that she can prescribe something else cheaper.    Pt wants to be informed on what other medication will

## 2020-03-18 NOTE — TELEPHONE ENCOUNTER
PT called back and stated that she and her  been calling different insurances but there has not been a response. They been leaving messages and hopefully soon they will be enrolled in one.    Right now she only has a pharmacy insurance but doesn't kn

## 2020-03-18 NOTE — TELEPHONE ENCOUNTER
Patient lost her insurance as of this month and can not afford Erma Baldwin, she is looking to see what other medication can be given that is not that expensive.   Per Dr Luis Archuleta and because her diabetes has been under control, she prefers that for now to jus

## 2020-03-18 NOTE — TELEPHONE ENCOUNTER
Message left to call back. Dr Chambers Rather wants to know how long is she going to be out of insurance? ?

## 2020-05-04 ENCOUNTER — TELEPHONE (OUTPATIENT)
Dept: FAMILY MEDICINE CLINIC | Facility: CLINIC | Age: 45
End: 2020-05-04

## 2020-05-04 RX ORDER — FAMOTIDINE 20 MG/1
20 TABLET ORAL
Qty: 30 TABLET | Refills: 0 | Status: SHIPPED | OUTPATIENT
Start: 2020-05-04 | End: 2020-06-03

## 2020-05-14 ENCOUNTER — TELEPHONE (OUTPATIENT)
Dept: FAMILY MEDICINE CLINIC | Facility: CLINIC | Age: 45
End: 2020-05-14

## 2020-05-14 RX ORDER — NAPROXEN 500 MG/1
500 TABLET ORAL 2 TIMES DAILY WITH MEALS
Qty: 60 TABLET | Refills: 0 | Status: SHIPPED | OUTPATIENT
Start: 2020-05-14 | End: 2020-06-03

## 2020-05-14 NOTE — TELEPHONE ENCOUNTER
Spoke to . MD authorized Naproxen 500 BID x 1 month. To also do warm compresses in the interim. If not better, will need appt. Verbalized understanding.

## 2020-05-14 NOTE — TELEPHONE ENCOUNTER
Pt called stating that since 2 weeks ago shes been having severe pain in the back of her head and it travels to her shoulders. She has been unable to sleep well because of the pain. On a scale from 1 to 10 she said her pain is a 8. Pt wants to see doctor.

## 2020-05-20 DIAGNOSIS — E78.2 MIXED HYPERLIPIDEMIA: ICD-10-CM

## 2020-05-20 DIAGNOSIS — E03.9 HYPOTHYROIDISM, UNSPECIFIED TYPE: ICD-10-CM

## 2020-05-20 RX ORDER — ATORVASTATIN CALCIUM 20 MG/1
TABLET, FILM COATED ORAL
Qty: 90 TABLET | Refills: 0 | Status: SHIPPED | OUTPATIENT
Start: 2020-05-20 | End: 2020-06-03

## 2020-05-20 RX ORDER — LEVOTHYROXINE SODIUM 0.07 MG/1
TABLET ORAL
Qty: 90 TABLET | Refills: 0 | Status: SHIPPED | OUTPATIENT
Start: 2020-05-20 | End: 2020-06-03

## 2020-05-22 RX ORDER — METHOTREXATE 2.5 MG/1
TABLET ORAL
Qty: 104 TABLET | Refills: 0 | Status: SHIPPED | OUTPATIENT
Start: 2020-05-22

## 2020-05-22 NOTE — TELEPHONE ENCOUNTER
Patient has no Insurance at this time. Pt asking how much an office visit would be, I gave her our billing dept phone number.

## 2020-05-22 NOTE — TELEPHONE ENCOUNTER
LOV: 12/13/2019  Future Appointments   Date Time Provider Marilu Genaoi   6/3/2020  4:40 PM Theo Pedersen MD [address]     Labs:   Component      Latest Ref Rng & Units 2/29/2020   Glucose      65 - 99 mg/dL 108 (H)   BUN      7 - 25 mg/dL 8   CREATININE      0.50 - 1.10 mg/dL 0.51   eGFR NON-AFR.  AMERICAN      > OR = 60 mL/min/1.73m2 117   eGFR AFRICAN AMERICAN      > OR = 60 mL/min/1.73m2 136   Bun/Creatinine Ratio      6 - 22 (calc) NOT APPLICABLE   SODIUM, SERUM      135 - 146 mmol/L 138   POTASSIUM, SERUM      3.5 - 5.3 mmol/L 4.1   CHLORIDE, SERUM      98 - 110 mmol/L 103   CARBON DIOXIDE      20 - 32 mmol/L 29   CALCIUM      8.6 - 10.2 mg/dL 8.9   PROTEIN, TOTAL      6.1 - 8.1 g/dL 6.9   Albumin      3.6 - 5.1 g/dL 4.0   Globulin, Total      1.9 - 3.7 g/dL (calc) 2.9   ALBUMIN/GLOBULIN RATIO      1.0 - 2.5 (calc) 1.4   Total Bilirubin      0.2 - 1.2 mg/dL 0.3   Alkaline Phosphatase      31 - 125 U/L 66   AST      10 - 30 U/L 20   ALT (SGPT)      6 - 29 U/L 30 (H)     Component      Latest Ref Rng & Units 12/12/2019   WHITE BLOOD CELL COUNT      3.8 - 10.8 Thousand/uL 6.1   RED BLOOD CELL COUNT      3.80 - 5.10 Million/uL 4.80   Hemoglobin      11.7 - 15.5 g/dL 13.2   Hematocrit      35.0 - 45.0 % 39.6   MCV      80.0 - 100.0 fL 82.5   MCH      27.0 - 33.0 pg 27.5   MCHC      32.0 - 36.0 g/dL 33.3   RDW      11.0 - 15.0 % 16.9 (H)   Platelet Count      024 - 400 Thousand/uL 230   MPV      7.5 - 12.5 fL 11.8   NEUTROPHILS ABSOLUTE      1,500 - 7,800 cells/uL 4,484   ABSOLUTE LYMPHOCYTES      850 - 3,900 cells/uL 1,019   ABSOLUTE MONOCYTES      200 - 950 cells/uL 445   ABSOLUTE EOSINOPHILS      15 - 500 cells/uL 110   ABSOLUTE BASOPHILS      0 - 200 cells/uL 43   NEUTROPHILS      % 73.5   LYMPHOCYTES      % 16.7   MONOCYTES      % 7.3   EOSINOPHILS      % 1.8   BASOPHILS      % 0.7   Albumin      3.6 - 5.1 g/dL 4.4   AST      10 - 30 U/L 22   C-REACTIVE PROTEIN      <8.0 mg/L 1.7   SED RATE BY MODIFIED$WESTERGREN      < OR = 20 mm/h 17

## 2020-06-03 ENCOUNTER — TELEPHONE (OUTPATIENT)
Dept: FAMILY MEDICINE CLINIC | Facility: CLINIC | Age: 45
End: 2020-06-03

## 2020-06-03 RX ORDER — LEVOTHYROXINE SODIUM 0.07 MG/1
75 TABLET ORAL
Qty: 90 TABLET | Refills: 0 | Status: SHIPPED | OUTPATIENT
Start: 2020-06-03

## 2020-06-03 RX ORDER — ATORVASTATIN CALCIUM 20 MG/1
20 TABLET, FILM COATED ORAL NIGHTLY
Qty: 90 TABLET | Refills: 0 | Status: SHIPPED | OUTPATIENT
Start: 2020-06-03

## 2020-06-03 RX ORDER — FOLIC ACID 1 MG/1
1 TABLET ORAL DAILY
Qty: 90 TABLET | Refills: 0 | Status: SHIPPED | OUTPATIENT
Start: 2020-06-03

## 2020-06-03 RX ORDER — FAMOTIDINE 20 MG/1
20 TABLET ORAL DAILY PRN
Qty: 90 TABLET | Refills: 0 | Status: SHIPPED | OUTPATIENT
Start: 2020-06-03

## 2020-06-03 NOTE — TELEPHONE ENCOUNTER
Patient is calling for refills. Currently has no insurance. And is the only one working, at the moment it is hard for her to pay for visit.

## 2020-06-05 RX ORDER — NAPROXEN 500 MG/1
TABLET ORAL
Qty: 60 TABLET | Refills: 0 | Status: SHIPPED | OUTPATIENT
Start: 2020-06-05

## 2020-06-27 RX ORDER — NAPROXEN 500 MG/1
TABLET ORAL
Qty: 60 TABLET | Refills: 0 | OUTPATIENT
Start: 2020-06-27

## 2020-07-21 DIAGNOSIS — E11.9 TYPE 2 DIABETES MELLITUS WITHOUT COMPLICATIONS (HCC): ICD-10-CM

## 2020-08-04 DIAGNOSIS — E78.2 MIXED HYPERLIPIDEMIA: ICD-10-CM

## 2020-08-04 RX ORDER — ATORVASTATIN CALCIUM 20 MG/1
TABLET, FILM COATED ORAL
Qty: 90 TABLET | Refills: 0 | OUTPATIENT
Start: 2020-08-04

## 2020-08-10 DIAGNOSIS — E03.9 HYPOTHYROIDISM, UNSPECIFIED TYPE: ICD-10-CM

## 2020-08-10 RX ORDER — LEVOTHYROXINE SODIUM 0.05 MG/1
TABLET ORAL
Qty: 90 TABLET | Refills: 0 | OUTPATIENT
Start: 2020-08-10

## 2021-06-07 NOTE — TELEPHONE ENCOUNTER
Patient was seen last month for foot pain and got medications for it, she continues with pain and would like to know if a new RX can be called in to her [facility], please advise. Patient seen and examined at bedside  no acute overnight events.   Patient feels well  tolerating diet  no further bleeding reported or noted  for EGD am 6/7    Review of Systems:  General:denies fever chills, headache, weakness  HEENT: denies blurry vision,diffculty swallowing, difficulty hearing, tinnitus  Cardiovascular: denies chest pain  ,palpitations  Pulmonary:denies shortness of breath, cough, wheezing, hemoptysis  Gastrointestinal: denies abdominal pain, constipation, diarrhea,nausea , vomiting, hematochezia  : denies hematuria, dysuria, or incontinence  Neurological: denies weakness, numbness , tingling, dizziness, tremors  MSK: denies muscle pain, difficulty ambulating, swelling, back pain  skin: denies skin rash, itching, burning, or  skin lesions  Psychiatrical: denies mood disturbances, anxierty, feeling depressed, depression , or difficulty sleeping    Objective:  Vitals  T(C): 36.9 (06-06-21 @ 10:44), Max: 36.9 (06-05-21 @ 23:16)  HR: 99 (06-06-21 @ 10:44) (93 - 99)  BP: 133/73 (06-06-21 @ 10:44) (113/80 - 133/73)  RR: 18 (06-06-21 @ 10:44) (18 - 18)  SpO2: 98% (06-06-21 @ 10:44) (98% - 99%)    Physical Exam:  General: comfortable, no acute distress, well nourished  HEENT: Atraumatic, no LAD, trachea midline, PERRLA  Cardiovascular: normal s1s2, no murmurs, gallops or fricition rubs  Pulmonary: clear to ausculation Bilaterally, no wheezing , rhonchi  Gastrointestinal: soft non tender non distended, no masses felt, no organomegally  Muscloskeletal: no lower extremity edema, intact bilateral lower extremity pulses  Neurological: CN II-12 intact. No focal weakness  Psychiatrical: normal mood, cooperative  SKIN: no rash, lesions or ulcers    Labs:                          10.5   7.84  )-----------( 213      ( 06 Jun 2021 06:24 )             31.3     06-06    141  |  108  |  15  ----------------------------<  93  3.9   |  26  |  1.04    Ca    7.7<L>      06 Jun 2021 06:24  Phos  3.5     06-06  Mg     2.2     06-06    TPro  5.7<L>  /  Alb  2.7<L>  /  TBili  0.4  /  DBili  x   /  AST  20  /  ALT  45  /  AlkPhos  55  06-04    LIVER FUNCTIONS - ( 04 Jun 2021 17:14 )  Alb: 2.7 g/dL / Pro: 5.7 gm/dL / ALK PHOS: 55 U/L / ALT: 45 U/L / AST: 20 U/L / GGT: x                 Active Medications  MEDICATIONS  (STANDING):  hydrocortisone hemorrhoidal Suppository 1 Suppository(s) Rectal daily  pantoprazole Infusion 8 mG/Hr (10 mL/Hr) IV Continuous <Continuous>    MEDICATIONS  (PRN):  acetaminophen   Tablet .. 650 milliGRAM(s) Oral every 6 hours PRN Temp greater or equal to 38C (100.4F), Mild Pain (1 - 3)     Patient is a 49y old  Male who presents with a chief complaint of UGI bleed  Syncope  Tachycardia (07 Jun 2021 16:05)      HPI:  49 year old male w hx gastric PUD came to ED by EMS w syncope, melena, blood per rectum      Over the past week pt developed fatigue  Tuesday 06-01 fled epigastric pain like ulcer was flaring up  +dark blood per rectum last night and today w melena  +syncope while defecating after 2nd BM today w blood and melena  wife called 911  +pain epigastric area w/o radiation  +diaphoresis  +felt hot today but had a normal temp when he checked  has had some back pain recently  does take ASA 81 mg qd as per PCP    Felt the same epigastric pain he had when he was admitted to [facility] 2019 when he had acute cholecystitis and had lap anthony  Has had EGD in past and was told of a healed gastric peptic ulcer but did not follow up afterwards.  Does not know if he had H. pylori      In ED /73      RR 19   T 98.2   97% sat RA  FOBT+  NS 1000 cc x 2 = 2000 cc  protonix 40 mg  H 13.7 then 12.1  CXR no acute infiltrate, heart size normal          PAST MEDICAL HX  PUD gastric peptic ulcer disease    PAST SURGICAL HX  EGD esophagogastroduodenoscopy  Laparoscopic cholecystectomy 01/20/2019       FAMILY HX  No pertinent family history in first degree relatives of GI disease, PUD      SOCIAL HX  Nonsmoker  works as  for [employer]        IMMUNIZATION  Moderna x 2 for COVID-19 APril 2021   (04 Jun 2021 20:48)      INTERVAL HPI/OVERNIGHT EVENTS: Patient seen earlier today  The patient denies melena, hematochezia, hematemesis, nausea, vomiting, abdominal pain, constipation, diarrhea, or change in bowel movements Patient tolerated solid diet      MEDICATIONS  (STANDING):  hydrocortisone hemorrhoidal Suppository 1 Suppository(s) Rectal daily  pantoprazole    Tablet 40 milliGRAM(s) Oral two times a day  sucralfate 1 Gram(s) Oral four times a day    MEDICATIONS  (PRN):  acetaminophen   Tablet .. 650 milliGRAM(s) Oral every 6 hours PRN Temp greater or equal to 38C (100.4F), Mild Pain (1 - 3)      FAMILY HISTORY:  No pertinent family history in first degree relatives        Allergies    No Known Drug Allergies  Seafood (Swelling)  shellfish (Swelling)    Intolerances        PMH/PSH:  No pertinent past medical history    No pertinent past medical history    No significant past surgical history    No significant past surgical history          REVIEW OF SYSTEMS:  CONSTITUTIONAL: No fever, weight loss, or fatigue  EYES: No eye pain, visual disturbances, or discharge  ENMT:  No difficulty hearing, tinnitus, vertigo; No sinus or throat pain  NECK: No pain or stiffness  BREASTS: No pain, masses, or nipple discharge  RESPIRATORY: No cough, wheezing, chills or hemoptysis; No shortness of breath  CARDIOVASCULAR: No chest pain, palpitations, dizziness, or leg swelling  GASTROINTESTINAL: See above   GENITOURINARY: No dysuria, frequency, hematuria, or incontinence  NEUROLOGICAL: No headaches, memory loss, loss of strength, numbness, or tremors  SKIN: No itching, burning, rashes, or lesions   LYMPH NODES: No enlarged glands  ENDOCRINE: No heat or cold intolerance; No hair loss  MUSCULOSKELETAL: No joint pain or swelling; No muscle, back, or extremity pain  PSYCHIATRIC: No depression, anxiety, mood swings, or difficulty sleeping  HEME/LYMPH: No easy bruising, or bleeding gums  ALLERGY AND IMMUNOLOGIC: No hives or eczema    Vital Signs Last 24 Hrs  T(C): 36.6 (07 Jun 2021 16:20), Max: 36.8 (07 Jun 2021 01:06)  T(F): 97.8 (07 Jun 2021 16:20), Max: 98.3 (07 Jun 2021 10:32)  HR: 92 (07 Jun 2021 16:20) (80 - 98)  BP: 128/80 (07 Jun 2021 16:20) (102/57 - 128/80)  BP(mean): --  RR: 18 (07 Jun 2021 16:20) (16 - 18)  SpO2: 99% (07 Jun 2021 16:20) (97% - 100%)    PHYSICAL EXAM:  GENERAL: NAD, well-groomed, well-developed  HEAD:  Atraumatic, Normocephalic  EYES: EOMI, PERRLA, conjunctiva and sclera clear  NECK: Supple, No JVD, Normal thyroid  NERVOUS SYSTEM:  Alert & Oriented X3, Good concentration; Motor Strength 5/5 B/L upper and lower extremities;   CHEST/LUNG: Clear to percussion bilaterally; No rales, rhonchi, wheezing, or rubs  HEART: Regular rate and rhythm; No murmurs, rubs, or gallops  ABDOMEN: Soft, Nontender, Nondistended; Bowel sounds present  EXTREMITIES:  2+ Peripheral Pulses, No clubbing, cyanosis, or edema  LYMPH: No lymphadenopathy noted  SKIN: No rashes or lesions    LAB  06-04 @ 17:14  amylase --   lipase 84                           10.2   6.10  )-----------( 206      ( 07 Jun 2021 08:19 )             30.1       CBC:  06-07 @ 08:19  WBC 6.10   Hgb 10.2   Hct 30.1   Plts 206  MCV 92.3  06-06 @ 06:24  WBC 7.84   Hgb 10.5   Hct 31.3   Plts 213  MCV 91.8  06-05 @ 18:26  WBC 8.35   Hgb 11.5   Hct 34.6   Plts 224  MCV 92.8  06-05 @ 12:41  WBC 7.83   Hgb 11.9   Hct 35.9   Plts 232  MCV 92.8  06-05 @ 09:02  WBC 7.14   Hgb 12.2   Hct 37.0   Plts 182  MCV 92.7  06-04 @ 23:59  WBC 10.47   Hgb 11.4   Hct 35.4   Plts 229  MCV 95.2  06-04 @ 19:22  WBC 10.17   Hgb 12.1   Hct 36.5   Plts 237  MCV 92.4  06-04 @ 17:14  WBC 12.38   Hgb 13.7   Hct 41.6   Plts 291  MCV 93.5      Chemistry:  06-07 @ 08:19  Na+ 142  K+ 4.0  Cl- 110  CO2 28  BUN 13  Cr 1.00     06-06 @ 06:24  Na+ 141  K+ 3.9  Cl- 108  CO2 26  BUN 15  Cr 1.04     06-05 @ 09:02  Na+ 143  K+ 4.2  Cl- 112  CO2 29  BUN 25  Cr 0.97     06-04 @ 17:14  Na+ 143  K+ 4.2  Cl- 109  CO2 28  BUN 36  Cr 1.28         Glucose, Serum: 92 mg/dL (06-07 @ 08:19)  Glucose, Serum: 93 mg/dL (06-06 @ 06:24)  Glucose, Serum: 84 mg/dL (06-05 @ 09:02)  Glucose, Serum: 122 mg/dL (06-04 @ 17:14)      07 Jun 2021 08:19    142    |  110    |  13     ----------------------------<  92     4.0     |  28     |  1.00   06 Jun 2021 06:24    141    |  108    |  15     ----------------------------<  93     3.9     |  26     |  1.04   05 Jun 2021 09:02    143    |  112    |  25     ----------------------------<  84     4.2     |  29     |  0.97   04 Jun 2021 17:14    143    |  109    |  36     ----------------------------<  122    4.2     |  28     |  1.28     Ca    8.0        07 Jun 2021 08:19  Ca    7.7        06 Jun 2021 06:24  Ca    7.9        05 Jun 2021 09:02  Ca    7.9        04 Jun 2021 17:14  Phos  3.5       06 Jun 2021 06:24  Phos  2.8       05 Jun 2021 09:02  Mg     2.2       06 Jun 2021 06:24  Mg     2.4       05 Jun 2021 09:02    TPro  5.7    /  Alb  2.7    /  TBili  0.4    /  DBili  x      /  AST  20     /  ALT  45     /  AlkPhos  55     04 Jun 2021 17:14              CAPILLARY BLOOD GLUCOSE              RADIOLOGY & ADDITIONAL TESTS:    Imaging Personally Reviewed:  [ ] YES  [ ] NO    Consultant(s) Notes Reviewed:  [ ] YES  [ ] NO    Care Discussed with Consultants/Other Providers [ ] YES  [ ] NO Patient seen and examined at bedside.  No acute overnight events  patient feels well no bleeding notes  labs reviewed. predominantly stable  trops x 2 negative  Pending GI consult    Review of Systems:  General:denies fever chills, headache, weakness  HEENT: denies blurry vision,diffculty swallowing, difficulty hearing, tinnitus  Cardiovascular: denies chest pain  ,palpitations  Pulmonary:denies shortness of breath, cough, wheezing, hemoptysis  Gastrointestinal: denies abdominal pain, constipation, diarrhea,nausea , vomiting, hematochezia  : denies hematuria, dysuria, or incontinence  Neurological: denies weakness, numbness , tingling, dizziness, tremors  MSK: denies muscle pain, difficulty ambulating, swelling, back pain  skin: denies skin rash, itching, burning, or  skin lesions  Psychiatrical: denies mood disturbances, anxierty, feeling depressed, depression , or difficulty sleeping    Objective:  Vitals  T(C): 36.8 (06-05-21 @ 10:48), Max: 37.2 (06-04-21 @ 19:19)  HR: 94 (06-05-21 @ 10:48) (88 - 134)  BP: 105/70 (06-05-21 @ 10:48) (105/70 - 129/84)  RR: 18 (06-05-21 @ 10:48) (18 - 20)  SpO2: 100% (06-05-21 @ 10:48) (96% - 100%)    Physical Exam:  General: comfortable, no acute distress, well nourished  HEENT: Atraumatic, no LAD, trachea midline, PERRLA  Cardiovascular: normal s1s2, no murmurs, gallops or fricition rubs  Pulmonary: clear to ausculation Bilaterally, no wheezing , rhonchi  Gastrointestinal: soft non tender non distended, no masses felt, no organomegally  Muscloskeletal: no lower extremity edema, intact bilateral lower extremity pulses  Neurological: CN II-12 intact. No focal weakness  Psychiatrical: normal mood, cooperative  SKIN: no rash, lesions or ulcers    Labs:                          12.2   7.14  )-----------( 182      ( 05 Jun 2021 09:02 )             37.0     06-05    143  |  112<H>  |  25<H>  ----------------------------<  84  4.2   |  29  |  0.97    Ca    7.9<L>      05 Jun 2021 09:02  Phos  2.8     06-05  Mg     2.4     06-05    TPro  5.7<L>  /  Alb  2.7<L>  /  TBili  0.4  /  DBili  x   /  AST  20  /  ALT  45  /  AlkPhos  55  06-04    LIVER FUNCTIONS - ( 04 Jun 2021 17:14 )  Alb: 2.7 g/dL / Pro: 5.7 gm/dL / ALK PHOS: 55 U/L / ALT: 45 U/L / AST: 20 U/L / GGT: x                 Active Medications  MEDICATIONS  (STANDING):  pantoprazole Infusion 8 mG/Hr (10 mL/Hr) IV Continuous <Continuous>    MEDICATIONS  (PRN):  acetaminophen   Tablet .. 650 milliGRAM(s) Oral every 6 hours PRN Temp greater or equal to 38C (100.4F), Mild Pain (1 - 3)                               Patient: CHAYA BATRES [id number] 49y Male                            Hospitalist Attending Note    No complaints.  No Abdominal pain, nausea, vomiting, diarrhea, nor constipation.   Denies bleeding.  Tolerating po intake.  Wife at bedside.   ____________________PHYSICAL EXAM:  GENERAL:  NAD Alert and Oriented x 3   HEENT: NCAT  CARDIOVASCULAR:  S1, S2  LUNGS: CTAB  ABDOMEN:  soft, (-) tenderness, (-) distension, (+) bowel sounds, (-) guarding, (-) rebound (-) rigidity  EXTREMITIES:  no cyanosis / clubbing / edema.   ____________________     VITALS:  Vital Signs Last 24 Hrs  T(C): 36.8 (2021 10:32), Max: 36.9 (2021 15:49)  T(F): 98.3 (2021 10:32), Max: 98.5 (2021 15:49)  HR: 88 (2021 13:10) (80 - 107)  BP: 109/72 (2021 10:32) (102/57 - 122/73)  BP(mean): --  RR: 18 (2021 10:32) (16 - 18)  SpO2: 97% (2021 10:32) (97% - 100%) Daily Height in cm: 167.64 (2021 05:02)    Daily Weight in k.9 (2021 06:44)  CAPILLARY BLOOD GLUCOSE        I&O's Summary    2021 07:01  -  2021 07:00  --------------------------------------------------------  IN: 120 mL / OUT: 0 mL / NET: 120 mL        HISTORY:  PAST MEDICAL & SURGICAL HISTORY:  No pertinent past medical history    No pertinent past medical history    No significant past surgical history    No significant past surgical history    Allergies    No Known Drug Allergies  Seafood (Swelling)  shellfish (Swelling)    Intolerances       LABS:                        10.2   6.10  )-----------( 206      ( 2021 08:19 )             30.1     06-07    142  |  110<H>  |  13  ----------------------------<  92  4.0   |  28  |  1.00    Ca    8.0<L>      2021 08:19  Phos  3.5     06-06  Mg     2.2     06-06                    MEDICATIONS:  MEDICATIONS  (STANDING):  hydrocortisone hemorrhoidal Suppository 1 Suppository(s) Rectal daily  pantoprazole    Tablet 40 milliGRAM(s) Oral two times a day  sucralfate 1 Gram(s) Oral four times a day    MEDICATIONS  (PRN):  acetaminophen   Tablet .. 650 milliGRAM(s) Oral every 6 hours PRN Temp greater or equal to 38C (100.4F), Mild Pain (1 - 3)   Patient is a 49y old  Male who presents with a chief complaint of UGI bleed  Syncope  Tachycardia (06 Jun 2021 11:50)      HPI:  49 year old male w hx gastric PUD came to ED by EMS w syncope, melena, blood per rectum      Over the past week pt developed fatigue  Tuesday 06-01 fled epigastric pain like ulcer was flaring up  +dark blood per rectum last night and today w melena  +syncope while defecating after 2nd BM today w blood and melena  wife called 911  +pain epigastric area w/o radiation  +diaphoresis  +felt hot today but had a normal temp when he checked  has had some back pain recently  does take ASA 81 mg qd as per PCP    Felt the same epigastric pain he had when he was admitted to [facility] 2019 when he had acute cholecystitis and had lap anthony  Has had EGD in past and was told of a healed gastric peptic ulcer but did not follow up afterwards.  Does not know if he had H. pylori      In ED /73      RR 19   T 98.2   97% sat RA  FOBT+  NS 1000 cc x 2 = 2000 cc  protonix 40 mg  H 13.7 then 12.1  CXR no acute infiltrate, heart size normal          PAST MEDICAL HX  PUD gastric peptic ulcer disease    PAST SURGICAL HX  EGD esophagogastroduodenoscopy  Laparoscopic cholecystectomy 01/20/2019       FAMILY HX  No pertinent family history in first degree relatives of GI disease, PUD      SOCIAL HX  Nonsmoker  works as  for [employer]        IMMUNIZATION  Moderna x 2 for COVID-19 APril 2021   (04 Jun 2021 20:48)      INTERVAL HPI/OVERNIGHT EVENTS:  The patient denies melena, hematochezia, hematemesis, nausea, vomiting, abdominal pain, constipation, diarrhea, or change in bowel movements Tolerating regular diet    MEDICATIONS  (STANDING):  hydrocortisone hemorrhoidal Suppository 1 Suppository(s) Rectal daily  pantoprazole Infusion 8 mG/Hr (10 mL/Hr) IV Continuous <Continuous>    MEDICATIONS  (PRN):  acetaminophen   Tablet .. 650 milliGRAM(s) Oral every 6 hours PRN Temp greater or equal to 38C (100.4F), Mild Pain (1 - 3)      FAMILY HISTORY:  No pertinent family history in first degree relatives        Allergies    No Known Drug Allergies  Seafood (Swelling)  shellfish (Swelling)    Intolerances        PMH/PSH:  No pertinent past medical history    No pertinent past medical history    No significant past surgical history    No significant past surgical history          REVIEW OF SYSTEMS:  CONSTITUTIONAL: No fever, weight loss, or fatigue  EYES: No eye pain, visual disturbances, or discharge  ENMT:  No difficulty hearing, tinnitus, vertigo; No sinus or throat pain  NECK: No pain or stiffness  BREASTS: No pain, masses, or nipple discharge  RESPIRATORY: No cough, wheezing, chills or hemoptysis; No shortness of breath  CARDIOVASCULAR: No chest pain, palpitations, dizziness, or leg swelling  GASTROINTESTINAL:  See above   GENITOURINARY: No dysuria, frequency, hematuria, or incontinence  NEUROLOGICAL: No headaches, memory loss, loss of strength, numbness, or tremors  SKIN: No itching, burning, rashes, or lesions   LYMPH NODES: No enlarged glands  ENDOCRINE: No heat or cold intolerance; No hair loss  MUSCULOSKELETAL: No joint pain or swelling; No muscle, back, or extremity pain  PSYCHIATRIC: No depression, anxiety, mood swings, or difficulty sleeping  HEME/LYMPH: No easy bruising, or bleeding gums  ALLERGY AND IMMUNOLOGIC: No hives or eczema    Vital Signs Last 24 Hrs  T(C): 36.9 (06 Jun 2021 10:44), Max: 36.9 (05 Jun 2021 23:16)  T(F): 98.5 (06 Jun 2021 10:44), Max: 98.5 (06 Jun 2021 10:44)  HR: 99 (06 Jun 2021 10:44) (93 - 99)  BP: 133/73 (06 Jun 2021 10:44) (113/80 - 133/73)  BP(mean): --  RR: 18 (06 Jun 2021 10:44) (18 - 18)  SpO2: 98% (06 Jun 2021 10:44) (98% - 99%)    PHYSICAL EXAM:  GENERAL: NAD, well-groomed, well-developed  HEAD:  Atraumatic, Normocephalic  EYES: EOMI, PERRLA, conjunctiva and sclera clear  NECK: Supple, No JVD, Normal thyroid  NERVOUS SYSTEM:  Alert & Oriented X3, Good concentration; Motor Strength 5/5 B/L upper and lower extremities;   CHEST/LUNG: Clear to percussion bilaterally; No rales, rhonchi, wheezing, or rubs  HEART: Regular rate and rhythm; No murmurs, rubs, or gallops  ABDOMEN: Soft, Nontender, Nondistended; Bowel sounds present  EXTREMITIES:  2+ Peripheral Pulses, No clubbing, cyanosis, or edema  LYMPH: No lymphadenopathy noted  SKIN: No rashes or lesions    LAB  06-04 @ 17:14  amylase --   lipase 84                           10.5   7.84  )-----------( 213      ( 06 Jun 2021 06:24 )             31.3       CBC:  06-06 @ 06:24  WBC 7.84   Hgb 10.5   Hct 31.3   Plts 213  MCV 91.8  06-05 @ 18:26  WBC 8.35   Hgb 11.5   Hct 34.6   Plts 224  MCV 92.8  06-05 @ 12:41  WBC 7.83   Hgb 11.9   Hct 35.9   Plts 232  MCV 92.8  06-05 @ 09:02  WBC 7.14   Hgb 12.2   Hct 37.0   Plts 182  MCV 92.7  06-04 @ 23:59  WBC 10.47   Hgb 11.4   Hct 35.4   Plts 229  MCV 95.2  06-04 @ 19:22  WBC 10.17   Hgb 12.1   Hct 36.5   Plts 237  MCV 92.4  06-04 @ 17:14  WBC 12.38   Hgb 13.7   Hct 41.6   Plts 291  MCV 93.5      Chemistry:  06-06 @ 06:24  Na+ 141  K+ 3.9  Cl- 108  CO2 26  BUN 15  Cr 1.04     06-05 @ 09:02  Na+ 143  K+ 4.2  Cl- 112  CO2 29  BUN 25  Cr 0.97     06-04 @ 17:14  Na+ 143  K+ 4.2  Cl- 109  CO2 28  BUN 36  Cr 1.28         Glucose, Serum: 93 mg/dL (06-06 @ 06:24)  Glucose, Serum: 84 mg/dL (06-05 @ 09:02)  Glucose, Serum: 122 mg/dL (06-04 @ 17:14)      06 Jun 2021 06:24    141    |  108    |  15     ----------------------------<  93     3.9     |  26     |  1.04   05 Jun 2021 09:02    143    |  112    |  25     ----------------------------<  84     4.2     |  29     |  0.97   04 Jun 2021 17:14    143    |  109    |  36     ----------------------------<  122    4.2     |  28     |  1.28     Ca    7.7        06 Jun 2021 06:24  Ca    7.9        05 Jun 2021 09:02  Ca    7.9        04 Jun 2021 17:14  Phos  3.5       06 Jun 2021 06:24  Phos  2.8       05 Jun 2021 09:02  Mg     2.2       06 Jun 2021 06:24  Mg     2.4       05 Jun 2021 09:02    TPro  5.7    /  Alb  2.7    /  TBili  0.4    /  DBili  x      /  AST  20     /  ALT  45     /  AlkPhos  55     04 Jun 2021 17:14              CAPILLARY BLOOD GLUCOSE              RADIOLOGY & ADDITIONAL TESTS:    Imaging Personally Reviewed:  [ ] YES  [ ] NO    Consultant(s) Notes Reviewed:  [ ] YES  [ ] NO    Care Discussed with Consultants/Other Providers [ ] YES  [ ] NO

## 2022-05-03 ENCOUNTER — TELEPHONE (OUTPATIENT)
Dept: INTERNAL MEDICINE CLINIC | Facility: CLINIC | Age: 47
End: 2022-05-03

## 2025-06-05 NOTE — PROGRESS NOTES
HPI:   No chief complaint on file.      Shonda Gaitan is a 50 year old female presenting for:    Last OV was 3/2025 at [facility] in [place]  Had pap smear at last OV. Will obtain reults and told to repeat in 6mo      RA_> stable on plaquenil and hydroxychloroquine and folic acid    DM-> on metformin    HLD-> on atorvastin    Thyroid_. On synthroid    Results for orders placed or performed in visit on 06/05/25   CBC With Differential With Platelet    Collection Time: 06/05/25  2:48 PM   Result Value Ref Range    WBC 6.3 4.0 - 11.0 x10(3) uL    RBC 4.40 3.80 - 5.30 x10(6)uL    HGB 12.3 12.0 - 16.0 g/dL    HCT 38.3 35.0 - 48.0 %    MCV 87.0 80.0 - 100.0 fL    MCH 28.0 26.0 - 34.0 pg    MCHC 32.1 31.0 - 37.0 g/dL    RDW-SD 44.3 35.1 - 46.3 fL    RDW 13.9 11.0 - 15.0 %    .0 150.0 - 450.0 10(3)uL    Neutrophil Absolute Prelim 4.51 1.50 - 7.70 x10 (3) uL    Neutrophil Absolute 4.51 1.50 - 7.70 x10(3) uL    Lymphocyte Absolute 1.06 1.00 - 4.00 x10(3) uL    Monocyte Absolute 0.51 0.10 - 1.00 x10(3) uL    Eosinophil Absolute 0.12 0.00 - 0.70 x10(3) uL    Basophil Absolute 0.05 0.00 - 0.20 x10(3) uL    Immature Granulocyte Absolute 0.01 0.00 - 1.00 x10(3) uL    Neutrophil % 72.1 %    Lymphocyte % 16.9 %    Monocyte % 8.1 %    Eosinophil % 1.9 %    Basophil % 0.8 %    Immature Granulocyte % 0.2 %   Comp Metabolic Panel (14)    Collection Time: 06/05/25  2:48 PM   Result Value Ref Range    Glucose 100 (H) 70 - 99 mg/dL    Sodium 139 136 - 145 mmol/L    Potassium 3.9 3.5 - 5.1 mmol/L    Chloride 105 98 - 112 mmol/L    CO2 26.0 21.0 - 32.0 mmol/L    Anion Gap 8 0 - 18 mmol/L    BUN 7 (L) 9 - 23 mg/dL    Creatinine 0.62 0.55 - 1.02 mg/dL    BUN/CREA Ratio 11.3 10.0 - 20.0    Calcium, Total 9.3 8.7 - 10.4 mg/dL    Calculated Osmolality 286 275 - 295 mOsm/kg    eGFR-Cr 109 >=60 mL/min/1.73m2    ALT 20 10 - 49 U/L    AST 18 <34 U/L    Alkaline Phosphatase 83 39 - 100 U/L    Bilirubin, Total 0.2 (L) 0.3 -  1.2 mg/dL    Total Protein 7.4 5.7 - 8.2 g/dL    Albumin 4.6 3.2 - 4.8 g/dL    Globulin  2.8 2.0 - 3.5 g/dL    A/G Ratio 1.6 1.0 - 2.0    Patient Fasting for CMP? No    Hemoglobin A1C    Collection Time: 06/05/25  2:48 PM   Result Value Ref Range    HgbA1C 7.2 (H) <5.7 %    Estimated Average Glucose 160 (H) 68 - 126 mg/dL   Lipid Panel    Collection Time: 06/05/25  2:48 PM   Result Value Ref Range    Cholesterol, Total 112 <200 mg/dL    HDL Cholesterol 40 40 - 59 mg/dL    Triglycerides 102 30 - 149 mg/dL    LDL Cholesterol 53 <100 mg/dL    VLDL 15 0 - 30 mg/dL    Non HDL Chol 72 <130 mg/dL    Patient Fasting for Lipid? No    TSH W Reflex To Free T4    Collection Time: 06/05/25  2:48 PM   Result Value Ref Range    TSH 0.461 (L) 0.550 - 4.780 uIU/mL   Microalb/Creat Ratio, Random Urine    Collection Time: 06/05/25  2:48 PM   Result Value Ref Range    Microalbumin, Urine 0.30 mg/dL    Creatinine Ur Random 21.50 mg/dL    Malb/Cre Calc 14.0 <=30.0 ug/mg   T4, FREE (S)    Collection Time: 06/05/25  2:48 PM   Result Value Ref Range    Free T4 1.5 0.8 - 1.7 ng/dL   Free T3 (Triiodothryronine)    Collection Time: 06/05/25  2:48 PM   Result Value Ref Range    T3 Free 2.99 2.40 - 4.20 pg/mL       Labs:   Lab Results   Component Value Date/Time    A1C 7.2 (H) 06/05/2025 02:48 PM      Lab Results   Component Value Date/Time    CHOLEST 112 06/05/2025 02:48 PM    HDL 40 06/05/2025 02:48 PM    TRIG 102 06/05/2025 02:48 PM    LDL 53 06/05/2025 02:48 PM    NONHDLC 72 06/05/2025 02:48 PM       Lab Results   Component Value Date/Time     (H) 06/05/2025 02:48 PM     06/05/2025 02:48 PM    K 3.9 06/05/2025 02:48 PM     06/05/2025 02:48 PM    CO2 26.0 06/05/2025 02:48 PM    CREATSERUM 0.62 06/05/2025 02:48 PM    CA 9.3 06/05/2025 02:48 PM    ALB 4.6 06/05/2025 02:48 PM    TP 7.4 06/05/2025 02:48 PM    ALKPHO 83 06/05/2025 02:48 PM    AST 18 06/05/2025 02:48 PM    ALT 20 06/05/2025 02:48 PM    BILT 0.2 (L) 06/05/2025  02:48 PM    TSH 0.461 (L) 06/05/2025 02:48 PM    T4F 1.5 06/05/2025 02:48 PM          Medications:  Current Medications[1]   Past Medical History[2]      Past Surgical History[3]  Allergies[4]   Social History:  Short Social Hx on File[5]   Family History:  Family History[6]       REVIEW OF SYSTEMS:   Review of Systems   Constitutional:  Negative for fatigue and fever.   Respiratory:  Negative for cough and shortness of breath.    Cardiovascular:  Negative for chest pain, palpitations and leg swelling.   Gastrointestinal:  Negative for abdominal pain, constipation, diarrhea and vomiting.   Musculoskeletal:  Positive for arthralgias.   Neurological:  Negative for headaches.            PHYSICAL EXAM:   Pulse 80   Temp 98.2 °F (36.8 °C)   Ht 4' 9\" (1.448 m)   Wt 119 lb (54 kg)   LMP 05/27/2025 (Exact Date)   SpO2 98%   BMI 25.75 kg/m²  Estimated body mass index is 25.75 kg/m² as calculated from the following:    Height as of this encounter: 4' 9\" (1.448 m).    Weight as of this encounter: 119 lb (54 kg).     Wt Readings from Last 3 Encounters:   06/05/25 119 lb (54 kg)   06/03/20 123 lb (55.8 kg)   02/26/20 125 lb (56.7 kg)       Physical Exam  Vitals reviewed.   Constitutional:       General: She is not in acute distress.     Appearance: She is well-developed.   Cardiovascular:      Rate and Rhythm: Normal rate and regular rhythm.      Heart sounds: Normal heart sounds. No murmur heard.  Pulmonary:      Effort: Pulmonary effort is normal. No respiratory distress.      Breath sounds: Normal breath sounds.   Abdominal:      General: Bowel sounds are normal. There is no distension.      Palpations: Abdomen is soft.      Tenderness: There is no abdominal tenderness.   Musculoskeletal:      Right lower leg: No edema.      Left lower leg: No edema.   Neurological:      General: No focal deficit present.             ASSESSMENT AND PLAN:   Patient is a 50 year old female who presents primarily presents for:    Diagnoses  and all orders for this visit:    Encounter for screening mammogram for malignant neoplasm of breast  -     [facility] ROSY 2D+3D SCREENING BILAT (CPT=77067/77844); Future    Screening for colon cancer  -     [facility] Telephone Colon Screen; Future    Rheumatoid arthritis involving multiple sites with positive rheumatoid factor (HCC)  -     Rheumatology Referral - In Network  -     Ophthalmology Referral - In Network  -CPM pending labs    Type 2 diabetes mellitus without complication, without long-term current use of insulin (HCC)  -     Ophthalmology Referral - In Network  -     Microalb/Creat Ratio, Random Urine  Diabetic control is well controlled   Last A1c value was 7.2% done 6/5/2025.    Recommendations are:   Will CPM  Advised weight and diabetic/lipid management with carbohydrate controlled ADA and/or low fat/cholesterol DASH diet and exercise (3 times a week for 30+ minutes each time)  Refer to Ophthalmology annually for routine eye exam  Check feet daily.  Podiatry evaluation prn.      Mixed hyperlipidemia  -CPM    Hypothyroidism, unspecified type  -CPM pending labs    Healthcare maintenance  -     CBC With Differential With Platelet  -     Comp Metabolic Panel (14)  -     Hemoglobin A1C  -     Lipid Panel  -     TSH W Reflex To Free T4  -     T4, FREE (S)  -     Free T3 (Triiodothryronine)    Screening for cardiovascular condition  -     CT CALCIUM SCORING; Future    Other orders  -     PCV20 (Prevnar 20)           Return in about 4 months (around 10/5/2025) for physical, pap.  Patient indicates understanding of the above recommendations and agrees to the above plan.  Reasurrance and education provided. All questions answered.  Notified to call with any questions, complications, allergies, or worsening or changing symptoms as well as any side effects or complications from the treatments .  Red flags/ ER precautions discussed.    Spent 60 minutes including chart review, reviewing appropriate medical history,  evaluating patient, discussing treatment options, counseling and education (diet and exercise), ordering appropriate diagnostic tests and completing documentation.        Meds & Refills for this Visit:  Requested Prescriptions      No prescriptions requested or ordered in this encounter       Orders Placed This Encounter   Procedures    CBC With Differential With Platelet    Comp Metabolic Panel (14)    Hemoglobin A1C    Lipid Panel    TSH W Reflex To Free T4    Microalb/Creat Ratio, Random Urine    T4, FREE (S)    Free T3 (Triiodothryronine)    PCV20 (Prevnar 20)       Imaging & Consults:  RHEUMATOLOGY - INTERNAL  OPHTHALMOLOGY - INTERNAL  PCV20 VACCINE FOR INTRAMUSCULAR USE  [facility] ROSY 2D+3D SCREENING BILAT (CPT=77067/88883)  CT CALCIUM SCORING  OP REFERRAL TO [facility] GI TELEPHONE COLON SCREEN    Health Maintenance:  Health Maintenance   Topic Date Due    Annual Physical  Never done    Diabetes Care Dilated Eye Exam  Never done    Colorectal Cancer Screening  Never done    Diabetes Care: GFR  Never done    DTaP,Tdap,and Td Vaccines (1 - Tdap) Never done    Diabetes Care Foot Exam  08/14/2019    Pneumococcal Vaccine: Birth to 50yrs (2 of 2 - PCV) 08/14/2019    Mammogram  09/18/2019    Diabetes Care A1C  05/29/2020    Pap Smear  08/14/2021    COVID-19 Vaccine (3 - 2024-25 season) 09/01/2024    Annual Depression Screening  Never done    Diabetes Care: Microalb/Creat Ratio (Annual)  Never done    Influenza Vaccine (Season Ended) 10/01/2025    Zoster Vaccines (1 of 2) 01/20/2026    Meningococcal B Vaccine  Aged Out         Madison Medina MD                  [1]   Current Outpatient Medications   Medication Sig Dispense Refill    Vitamin C 500 MG Oral Tab Take 1 tablet (500 mg total) by mouth daily.      cyanocobalamin 1000 MCG Oral Tab Take 1 tablet (1,000 mcg total) by mouth daily.      Cholecalciferol (VITAMIN D3) 25 MCG (1000 UT) Oral Cap Take 1 tablet by mouth daily.      atorvastatin 20 MG Oral Tab Take 1  tablet (20 mg total) by mouth nightly. (Patient taking differently: Take 0.5 tablets (10 mg total) by mouth nightly.) 90 tablet 0    folic acid 1 MG Oral Tab Take 1 tablet (1 mg total) by mouth daily. 90 tablet 0    Levothyroxine Sodium 75 MCG Oral Tab Take 1 tablet (75 mcg total) by mouth before breakfast. 90 tablet 0    metFORMIN HCl 1000 MG Oral Tab Take 1 tablet (1,000 mg total) by mouth 2 (two) times daily with meals. 180 tablet 0    Hydroxychloroquine Sulfate 200 MG Oral Tab TOME DOS TABLETAS POR VIA ORAL TODOS LOS TORRES (Patient taking differently: Take 1 tablet (200 mg total) by mouth daily. TOME lilo TABLETAS POR VIA ORAL TODOS LOS TORRES) 180 tablet 1   [2]   Past Medical History:   Diabetes mellitus (HCC)    Patient denies medical problems   [3] History reviewed. No pertinent surgical history.  [4] No Known Allergies  [5]   Social History  Socioeconomic History    Marital status:    Tobacco Use    Smoking status: Never    Smokeless tobacco: Never   Vaping Use    Vaping status: Never Used   Substance and Sexual Activity    Alcohol use: No    Drug use: No   [6]   Family History  Problem Relation Age of Onset    No Known Problems Father     No Known Problems Mother     No Known Problems Daughter     No Known Problems Son

## 2025-06-26 NOTE — PATIENT INSTRUCTIONS
1. Schedule colonoscopy with General Pool Endoscopist  Diagnosis: colon cancer screening  Sedation: MAC  Prep: split dose golytely      2. MEDICATION CHANGES PRIOR TO PROCEDURE    Please read the Bowel Preparation Instruction Sheet carefully, as it contains important information regarding your medications before the procedure. Many medications will need to be temporarily stopped to ensure your safety and the success of the procedure.   Commonly stopped medications include:    Diabetic medications (such as: metformin, glimepiride, sitagliptin (januvia), empagliflozin (jardiance), GLP-1 (semaglutide/ozempic/wegovy), insulin)    Weight loss medications (such as: phentermine, semaglutide/wegovy/ozempic, or tirzepatide/zepbound/mounjaro, contrave)    Anticoagulants or antiplatelet medications \"blood thinner\" (such as apixaban (Eliquis), warfarin (Coumadin), clopidogrel (Plavix), rivaroxaban (Xarelto))    Certain hypertension medications \"Ace inhibitors or ARBs\" (such as lisinopril, enalapril, losartan, valsartan, olmesartan)    Medications to treat erectile dysfunction, benign prostatic hyperplasia or pulmonary hypertension (such as viagra/sildenafil, Tadalafil (Cialis)    Additionally, vitamins and supplements should be stopped for 2 weeks prior to the procedure.    Failure to correctly stop these medications as instructed can result in cancellation of the procedure for your safety. It is crucial to follow these instructions closely. If you have any questions or concerns regarding your medications, please contact your healthcare provider before the procedure date.      *please read bowel preparation handout for complete list of medication adjustments*  14 Days Before Procedure   STOP all vitamins and supplements, including: Iron, multivitamins, vitamin E, herbal/mineral supplements, and over-the-counter diet medications    7 Days Before Procedure  STOP prasugrel (Effient) - antiplatelet  STOP Ozempic, semaglutide,  trulicity, mounjaro, wegovy  STOP any weight less medication    5 Days Before Procedure   STOP any of the following:  - Warfarin (Coumadin)  - Ticagrelor (Brilinta)  - Clopidogrel (Plavix)  - Acetylsalicylic acid / Dipyridamole (Aggrenox)    4 Days Before Procedure   STOP any of the following:  - Jardiance  - Dapagliflozin (Farxiga)  - Canagliflozin (Invokana)    3 Days Before Procedure   STOP any erectile dysfunction medications (e.g., Viagra, Cialis, Levitra, Slendra)    2 Days (48 Hours) Before Procedure   STOP any of the following:  - Rivaroxaban (Xarelto)  - Apixaban (Eliquis)  - Edoxaban (Savaysa)  - Dabigatran (Pradaxa)    1 Day Before & Day of Procedure   STOP Metformin   STOP Glimepiride, glipizide, pioglitazone   STOP Januvia, Tradjenta, Janumet    Day of Procedure   STOP all ADHD medications   STOP all diuretics    24 Hours Before Procedure   DO NOT CONSUME any alcohol or use recreational drugs       3.  bowel prep from pharmacy   You can pick the bowel prep up now and store in a cool, dry place in your home until your scheduled bowel prep start date.    4. Read all bowel prep instructions carefully. Bowel prep instructions can also be found online at:  [email or web address]     5. AVOID seeds, nuts, popcorn, raw fruits and vegetables for 5 days before procedure    6. If you start any NEW medication after your visit today, please notify us. Certain medications will need to be stopped before the procedure, or the procedure cannot be performed safely.

## 2025-06-26 NOTE — H&P
[facility] Gastroenterology                                                                                                  Clinic History and Physical     Chief Complaint   Patient presents with    Consult     colonoscopy       Requesting physician or provider: Madison Medina MD    HPI:   Shonda Gaitan is a 50 year old year-old female with history of rheumatoid arthritis, diabetes, hypothyroidism, GERD. The patient presents for colon cancer screening evaluation.  Guatemalan translation utilized during visit.     Patient denies any GI symptoms of nausea, vomiting, heartburn, dyspepsia, dysphagia, hematemesis, abdominal pain, change in bowel habits, constipation, diarrhea, hematochezia, or melena.  Additionally there is no unintentional weight loss.      PRIOR GI WORK UP:   No prior colonoscopy.       NSAIDS: none  Tobacco: none  Alcohol: none  Marijuana: none  Illicit drugs: none    FH GI malignancy:  none    No history of adverse reaction to sedation  No HUNTER  No anticoagulants/antiplatelet  No pacemaker/defibrillator  No pain medications and/or sleep aides    Wt Readings from Last 6 Encounters:   06/26/25 118 lb (53.5 kg)   06/05/25 119 lb (54 kg)   06/03/20 123 lb (55.8 kg)   02/26/20 125 lb (56.7 kg)   12/13/19 122 lb (55.3 kg)   11/27/19 125 lb (56.7 kg)          History, Medications, Allergies, ROS:      Past Medical History[1]   Past Surgical History[2]   Family Hx: Family History[3]   Social History: Short Social Hx on File[4]     Medications (Active prior to today's visit):  Current Medications[5]    Allergies:  Allergies[6]    ROS:   CONSTITUTIONAL: negative for fevers, chills, sweats  EYES Negative for scleral icterus or redness, and diplopia  HEENT: Negative for hoarseness  RESPIRATORY: Negative for cough and severe shortness of breath  CARDIOVASCULAR: Negative for crushing sub-sternal chest pain  GASTROINTESTINAL: See HPI  GENITOURINARY: Negative for dysuria  MUSCULOSKELETAL: Negative  for arthralgias and myalgias  SKIN: Negative for jaundice, rash or pruritus  NEUROLOGICAL: Negative for dizziness and headaches  BEHAVIOR/PSYCH: Negative for psychotic behavior      PHYSICAL EXAM:   Blood pressure 111/71, pulse 82, height 4' 9\" (1.448 m), weight 118 lb (53.5 kg), last menstrual period 05/27/2025.    GEN: Alert, no acute distress, well-nourished   HEENT: anicteric sclera, neck supple, trachea midline, MMM, no palpable or tender neck or supraclavicular lymph nodes  CV: RRR, the extremities are warm and well perfused   LUNGS: No increased work of breathing, CTAB  ABDOMEN: Soft, symmetrical, non-tender without distention or guarding. No scars or lesions. Aorta is without bruit or visible pulsation. Umbilicus is midline without herniation. Normoactive bowel sounds are present, No masses, hepatomegaly or splenomegaly noted.  MSK: No erythema, no warmth, no swelling of joints  SKIN: No jaundice, no erythema, no rashes, no lesions  HEMATOLOGIC: No bleeding, no bruising  NEURO: Alert and interactive, GRULLON  PSYCH: appropriate mood & affect    Labs/Imaging:     Patient's labs and imaging were reviewed and discussed with patient today.    .  ASSESSMENT/PLAN:   Shonda Gaitan is a 50 year old year-old female with history of rheumatoid arthritis, diabetes, hypothyroidism, GERD. The patient presents for colon cancer screening evaluation.    #colorectal cancer screening: patient is considered average risk for colon cancer (No immediate family hx of colon cancer) and it is appropriate to proceed with screening colonoscopy. Patient is currently asymptomatic and denies diarrhea, hematochezia, thin-stools or weight loss. We discussed risks/benefits/alternatives to procedure, including stool testing, they want to proceed with colonoscopy.        Patient Instructions   1. Schedule colonoscopy with General Pool Endoscopist  Diagnosis: colon cancer screening  Sedation: MAC  Prep: split dose golytely      2. MEDICATION  CHANGES PRIOR TO PROCEDURE    Please read the Bowel Preparation Instruction Sheet carefully, as it contains important information regarding your medications before the procedure. Many medications will need to be temporarily stopped to ensure your safety and the success of the procedure.   Commonly stopped medications include:    Diabetic medications (such as: metformin, glimepiride, sitagliptin (januvia), empagliflozin (jardiance), GLP-1 (semaglutide/ozempic/wegovy), insulin)    Weight loss medications (such as: phentermine, semaglutide/wegovy/ozempic, or tirzepatide/zepbound/mounjaro, contrave)    Anticoagulants or antiplatelet medications \"blood thinner\" (such as apixaban (Eliquis), warfarin (Coumadin), clopidogrel (Plavix), rivaroxaban (Xarelto))    Certain hypertension medications \"Ace inhibitors or ARBs\" (such as lisinopril, enalapril, losartan, valsartan, olmesartan)    Medications to treat erectile dysfunction, benign prostatic hyperplasia or pulmonary hypertension (such as viagra/sildenafil, Tadalafil (Cialis)    Additionally, vitamins and supplements should be stopped for 2 weeks prior to the procedure.    Failure to correctly stop these medications as instructed can result in cancellation of the procedure for your safety. It is crucial to follow these instructions closely. If you have any questions or concerns regarding your medications, please contact your healthcare provider before the procedure date.      *please read bowel preparation handout for complete list of medication adjustments*  14 Days Before Procedure   STOP all vitamins and supplements, including: Iron, multivitamins, vitamin E, herbal/mineral supplements, and over-the-counter diet medications    7 Days Before Procedure  STOP prasugrel (Effient) - antiplatelet  STOP Ozempic, semaglutide, trulicity, mounjaro, wegovy  STOP any weight less medication    5 Days Before Procedure   STOP any of the following:  - Warfarin (Coumadin)  - Ticagrelor  (Brilinta)  - Clopidogrel (Plavix)  - Acetylsalicylic acid / Dipyridamole (Aggrenox)    4 Days Before Procedure   STOP any of the following:  - Jardiance  - Dapagliflozin (Farxiga)  - Canagliflozin (Invokana)    3 Days Before Procedure   STOP any erectile dysfunction medications (e.g., Viagra, Cialis, Levitra, Slendra)    2 Days (48 Hours) Before Procedure   STOP any of the following:  - Rivaroxaban (Xarelto)  - Apixaban (Eliquis)  - Edoxaban (Savaysa)  - Dabigatran (Pradaxa)    1 Day Before & Day of Procedure   STOP Metformin   STOP Glimepiride, glipizide, pioglitazone   STOP Januvia, Tradjenta, Janumet    Day of Procedure   STOP all ADHD medications   STOP all diuretics    24 Hours Before Procedure   DO NOT CONSUME any alcohol or use recreational drugs       3.  bowel prep from pharmacy   You can pick the bowel prep up now and store in a cool, dry place in your home until your scheduled bowel prep start date.    4. Read all bowel prep instructions carefully. Bowel prep instructions can also be found online at:  www.[facility].org/giprep     5. AVOID seeds, nuts, popcorn, raw fruits and vegetables for 5 days before procedure    6. If you start any NEW medication after your visit today, please notify us. Certain medications will need to be stopped before the procedure, or the procedure cannot be performed safely.       Endoscopy risk/benefit discussion: I have thoroughly discussed the risks, benefits, and alternatives of endoscopic evaluation with the patient, who demonstrated understanding. This includes the potential risks of bleeding, infection, pain, anesthesia complications, and perforation, which may result in prolonged hospitalization or surgical intervention. All of the patient’s questions were addressed to their satisfaction. The patient has chosen to proceed with the endoscopic procedure, including any necessary interventions such as polypectomy, biopsy, control of bleeding.      Orders This  Visit:  No orders of the defined types were placed in this encounter.      Meds This Visit:  Requested Prescriptions     Signed Prescriptions Disp Refills    polyethylene glycol, PEG 3350-KCl-NaBcb-NaCl-NaSulf, 236 g Oral Recon Soln 1 each 0     Sig: Take 4,000 mL by mouth As Directed. Take 2,000 mL the night before your procedure and 2,000 mL the morning of your procedure.       Imaging & Referrals:  None       CELIA Dutton    [facility]enter[facility]  6/26/2025               [1]   Past Medical History:   Diabetes mellitus (HCC)    Patient denies medical problems   [2] History reviewed. No pertinent surgical history.  [3]   Family History  Problem Relation Age of Onset    No Known Problems Father     No Known Problems Mother     No Known Problems Daughter     No Known Problems Son    [4]   Social History  Socioeconomic History    Marital status:    Tobacco Use    Smoking status: Never    Smokeless tobacco: Never   Vaping Use    Vaping status: Never Used   Substance and Sexual Activity    Alcohol use: No    Drug use: No   [5]   Current Outpatient Medications   Medication Sig Dispense Refill    polyethylene glycol, PEG 3350-KCl-NaBcb-NaCl-NaSulf, 236 g Oral Recon Soln Take 4,000 mL by mouth As Directed. Take 2,000 mL the night before your procedure and 2,000 mL the morning of your procedure. 1 each 0    atorvastatin 10 MG Oral Tab Take 1 tablet (10 mg total) by mouth nightly. 90 tablet 3    levothyroxine 75 MCG Oral Tab Take 1 tablet (75 mcg total) by mouth before breakfast. 90 tablet 3    metFORMIN HCl 1000 MG Oral Tab Take 1 tablet (1,000 mg total) by mouth 2 (two) times daily with meals. 180 tablet 3    Vitamin C 500 MG Oral Tab Take 1 tablet (500 mg total) by mouth daily.      cyanocobalamin 1000 MCG Oral Tab Take 1 tablet (1,000 mcg total) by mouth daily.      Cholecalciferol (VITAMIN D3) 25 MCG (1000 UT) Oral Cap Take 1 tablet by mouth daily.      folic acid 1 MG Oral Tab Take 1 tablet  (1 mg total) by mouth daily. 90 tablet 0    Hydroxychloroquine Sulfate 200 MG Oral Tab TOME DOS TABLETAS POR VIA ORAL TODOS LOS TORRES 180 tablet 1   [6] No Known Allergies

## 2025-06-26 NOTE — TELEPHONE ENCOUNTER
Scheduled for:  Colonoscopy [id number]  Provider Name:  Dr Garcia  Date:  10/7/2025  Location:  [facility]  Sedation:  MAC  Time:  12:30 pm. (pt is aware that [facility] will call the day before to confirm arrival time)  Prep:  Golytely  Meds/Allergies Reconciled?:  CELIA Dutton reviewed  Diagnosis with codes:    CRC screening Z12.11  Was patient informed to call insurance with codes (Y/N):  Yes  Referral sent?:  Referral was sent at the time of electronic surgical scheduling.  [facility] or [facility] notified?:  I sent an electronic request to Endo Scheduling and received a confirmation today.  Medication Orders:  Patient is aware to NOT take iron pills, herbal meds and vitamins for 2 weeks before exam.   Hold Metformin the day before and the day of the procedure.  Also to NOT take any form of alcohol, recreational drugs and any forms of ED meds 24-72 hours before exam.   Misc Orders:  N/A   Further instructions given by staff:  I discussed the prep instructions with the patient which she verbally understood. I provided patient with prep instruction's sheet in office.      Patient was informed about the new cancellation policy for his/her procedure. Patient was also given a copy of the cancellation policy at the time of the appointment and verbalized understanding.

## 2025-07-21 NOTE — TELEPHONE ENCOUNTER
Please review; protocol failed/ has no protocol    Puja Ferrera2 hours ago (12:48 PM)     KE  Pt is calling requesting refill on folic acid and naproxen. Pt states she has ran out of medication.          None known

## 2025-07-21 NOTE — TELEPHONE ENCOUNTER
Pt is calling requesting refill on folic acid and naproxen. Pt states she has ran out of medication.

## (undated) NOTE — Clinical Note
Deisy Blandon. .. I forgot to give her the order for the mammogram. Can you please call her and give her the scheduling number. She has to get it done after 9/18/19.

## (undated) NOTE — LETTER
12/15/2019              Malika Aloknj        [address] [place] [place]          Dear Marcy Fairly,    I'm writing to let you know about the tests that were taken recently.     LAB RESULTS:  Kidney Function:Within Normal Limits, Lashell Victoria

## (undated) NOTE — LETTER
8/5/2018              [facility] [address] [place] [place]          Dear [facility],    I'm writing to let you know about your tests that were taken recently. LAB RESULTS:  Complete Blood Counts: Within Normal Limit

## (undated) NOTE — LETTER
Dr. Lupe Amaya MD     [address]  [address] [place], [place] [address] [place] [address] [place] [id number] [phone]     November 8, 2019      [address] [place] [address]